# Patient Record
Sex: MALE | Race: WHITE | NOT HISPANIC OR LATINO | Employment: FULL TIME | URBAN - METROPOLITAN AREA
[De-identification: names, ages, dates, MRNs, and addresses within clinical notes are randomized per-mention and may not be internally consistent; named-entity substitution may affect disease eponyms.]

---

## 2017-08-08 ENCOUNTER — OFFICE VISIT (OUTPATIENT)
Dept: FAMILY MEDICINE CLINIC | Facility: CLINIC | Age: 52
End: 2017-08-08

## 2017-08-08 VITALS
HEIGHT: 71 IN | SYSTOLIC BLOOD PRESSURE: 140 MMHG | DIASTOLIC BLOOD PRESSURE: 80 MMHG | WEIGHT: 216.2 LBS | HEART RATE: 71 BPM | OXYGEN SATURATION: 98 % | BODY MASS INDEX: 30.27 KG/M2

## 2017-08-08 DIAGNOSIS — T63.441A BEE STING REACTION, ACCIDENTAL OR UNINTENTIONAL, INITIAL ENCOUNTER: ICD-10-CM

## 2017-08-08 DIAGNOSIS — F41.8 SITUATIONAL ANXIETY: Primary | ICD-10-CM

## 2017-08-08 PROCEDURE — 99214 OFFICE O/P EST MOD 30 MIN: CPT | Performed by: INTERNAL MEDICINE

## 2017-08-08 RX ORDER — ESCITALOPRAM OXALATE 10 MG/1
10 TABLET ORAL DAILY
Qty: 30 TABLET | Refills: 2 | Status: SHIPPED | OUTPATIENT
Start: 2017-08-08 | End: 2017-11-02 | Stop reason: SDUPTHER

## 2017-08-08 NOTE — PROGRESS NOTES
Subjective   Raheel Mosqueda is a 52 y.o. male who presents today for:    Stress (Nerves) and Insect Bite (Bee stings)    History of Present Illness     This, and admits to a long-standing history of anxiety and being short tempered, usually more so at work. However, this is starting to spill over at home or his stepdaughter and a 3-year-old grandson now live to 2 his stepdaughter is . His stepson, with whom he has been very close, and his wife have both commented on him being withdrawn, short tempered, and very irritable. He has noticed that he awakens frequently  will earlier in the morning and has difficulty falling back to sleep. He frequently has racing thoughts and worries during that time.    Despite his long history of irritability, he has never taken any medications. He believes he may need medicine at this time to help control his mood since there is no timeline for his stepdaughter's moving back out.    Bee/wasp stings this year have resulted in local reaction of erythema and swelling, but never any respiratory distress or cardiovascular symptoms.    Mr. Mosqueda  reports that he has never smoked. He has never used smokeless tobacco. He reports that he does not drink alcohol or use illicit drugs.     No Known Allergies    Current Outpatient Prescriptions:   •  amLODIPine (NORVASC) 5 MG tablet, TAKE 1 TABLET BY MOUTH DAILY, Disp: 30 tablet, Rfl: PRN  •  aspirin 81 MG EC tablet, Take 81 mg by mouth daily., Disp: , Rfl:   •  benazepril (LOTENSIN) 20 MG tablet, TAKE 1 TABLET BY MOUTH DAILY, Disp: 30 tablet, Rfl: PRN  •  L-Theanine 100 MG capsule, Take 200 mg by mouth Daily., Disp: , Rfl:   •  MULTIPLE VITAMIN PO, Take  by mouth., Disp: , Rfl:     Family history of bee sting reaction; father  after bee sting at 42.    Review of Systems   Constitutional: Negative for unexpected weight change.   Respiratory: Negative for shortness of breath and wheezing.    Hematological: Negative for adenopathy. Does  "not bruise/bleed easily.   Psychiatric/Behavioral: Positive for decreased concentration, dysphoric mood and sleep disturbance (early morning awakenings w/ racing thoughts). The patient is nervous/anxious.          Objective   Vitals:    08/08/17 0954 08/08/17 1017   BP: 136/88 140/80   BP Location: Left arm Left arm   Patient Position: Sitting    Cuff Size: Large Adult Large Adult   Pulse: 71    SpO2: 98%    Weight: 216 lb 3.2 oz (98.1 kg)    Height: 71\" (180.3 cm)      Physical Exam   Constitutional: He is oriented to person, place, and time. He appears well-developed and well-nourished. No distress.   Eyes: Conjunctivae are normal.   Neck: No thyromegaly present.   Cardiovascular: Normal rate and regular rhythm.    Lymphadenopathy:     He has no cervical adenopathy.   Neurological: He is alert and oriented to person, place, and time.   Psychiatric: He has a normal mood and affect. His behavior is normal. Judgment and thought content normal.       Assessment/Plan   Raheel was seen today for stress and insect bite.    Diagnoses and all orders for this visit:    Situational anxiety    Bee sting reaction, accidental or unintentional, initial encounter  -     Ambulatory Referral to Allergy    Other orders  -     escitalopram (LEXAPRO) 10 MG tablet; Take 1 tablet by mouth Daily.    25 minutes of the 35 minute office visit were spent counseling the patient regarding coping skills and treatment options. We discussed using Lexapro, his need to take it on a daily basis, and the potential side effects. Some of these may be transient, so he was encouraged to contact me before stopping the medication altogether. We also discussed the potential that this dose will need to be increased.  "

## 2017-09-19 ENCOUNTER — OFFICE VISIT (OUTPATIENT)
Dept: FAMILY MEDICINE CLINIC | Facility: CLINIC | Age: 52
End: 2017-09-19

## 2017-09-19 VITALS
DIASTOLIC BLOOD PRESSURE: 80 MMHG | OXYGEN SATURATION: 98 % | BODY MASS INDEX: 30.27 KG/M2 | HEIGHT: 71 IN | HEART RATE: 61 BPM | SYSTOLIC BLOOD PRESSURE: 118 MMHG | WEIGHT: 216.2 LBS

## 2017-09-19 DIAGNOSIS — F41.8 SITUATIONAL ANXIETY: Primary | ICD-10-CM

## 2017-09-19 PROCEDURE — 99213 OFFICE O/P EST LOW 20 MIN: CPT | Performed by: INTERNAL MEDICINE

## 2017-09-19 NOTE — PROGRESS NOTES
Subjective   Raheel Mosqueda is a 52 y.o. male who presents today for:    Anxiety (1 month f/u)    History of Present Illness   He admits to a long-standing history of anxiety and being short tempered, usually more so at work. However, this is starting to spill over at home or his stepdaughter and a 3-year-old grandson now live to 2 his stepdaughter is . His stepson, with whom he has been very close, and his wife have both commented on him being withdrawn, short tempered, and very irritable. He has noticed that he awakens frequently earlier in the morning and has difficulty falling back to sleep. He frequently has racing thoughts and worries during that time.     Despite his long history of irritability, he has never taken any medications. He believes he may need medicine at this time to help control his mood since there is no timeline for his stepdaughter's moving back out.    The above history obtained in early August was treated with Lexapro 10 mg.  He has noticed a change in his ability to handle stressful situations.  He still has trouble with sleep at times, and he still has some anxiety and mood lability, but all are better after a month on the Lexapro.    Mr. Mosqueda  reports that he has never smoked. He has never used smokeless tobacco. He reports that he does not drink alcohol or use illicit drugs.     No Known Allergies    Current Outpatient Prescriptions:   •  amLODIPine (NORVASC) 5 MG tablet, TAKE 1 TABLET BY MOUTH DAILY, Disp: 30 tablet, Rfl: PRN  •  aspirin 81 MG EC tablet, Take 81 mg by mouth daily., Disp: , Rfl:   •  benazepril (LOTENSIN) 20 MG tablet, TAKE 1 TABLET BY MOUTH DAILY, Disp: 30 tablet, Rfl: PRN  •  escitalopram (LEXAPRO) 10 MG tablet, Take 1 tablet by mouth Daily., Disp: 30 tablet, Rfl: 2  •  MULTIPLE VITAMIN PO, Take  by mouth., Disp: , Rfl:   •  L-Theanine 100 MG capsule, Take 200 mg by mouth Daily., Disp: , Rfl:       Review of Systems   Constitutional: Positive for appetite  "change.   Gastrointestinal: Negative for abdominal pain.   Psychiatric/Behavioral: Positive for dysphoric mood and sleep disturbance (improving). The patient is nervous/anxious.        Objective   Vitals:    09/19/17 1113   BP: 118/80   BP Location: Right arm   Patient Position: Sitting   Cuff Size: Large Adult   Pulse: 61   SpO2: 98%   Weight: 216 lb 3.2 oz (98.1 kg)   Height: 71\" (180.3 cm)     Physical Exam   Constitutional: He is oriented to person, place, and time. He appears well-developed and well-nourished. No distress.   Neurological: He is alert and oriented to person, place, and time.   Psychiatric: He has a normal mood and affect. His behavior is normal. Judgment and thought content normal.       Assessment/Plan   Raheel was seen today for anxiety.    Diagnoses and all orders for this visit:    Situational anxiety    Doing very well on the current dose of Lexapro.  He will call if he feels like the dose is not enough as we get into the winter months and the holidays.  We will increase it to 15 mg daily.  Alternatively, if it is sleep and racing thoughts at night to plague him, we may try adding a low-dose of trazodone at bedtime and cutting back on the Lexapro if able.  "

## 2017-11-02 RX ORDER — ESCITALOPRAM OXALATE 10 MG/1
15 TABLET ORAL DAILY
Qty: 45 TABLET | Refills: 2 | Status: SHIPPED | OUTPATIENT
Start: 2017-11-02 | End: 2018-01-29 | Stop reason: SDUPTHER

## 2017-11-07 RX ORDER — ESCITALOPRAM OXALATE 10 MG/1
TABLET ORAL
Qty: 30 TABLET | Refills: 0 | OUTPATIENT
Start: 2017-11-07

## 2017-11-08 ENCOUNTER — RESULTS ENCOUNTER (OUTPATIENT)
Dept: FAMILY MEDICINE CLINIC | Facility: CLINIC | Age: 52
End: 2017-11-08

## 2017-11-08 DIAGNOSIS — Z00.00 ROUTINE GENERAL MEDICAL EXAMINATION AT HEALTH CARE FACILITY: ICD-10-CM

## 2017-11-13 ENCOUNTER — RESULTS ENCOUNTER (OUTPATIENT)
Dept: FAMILY MEDICINE CLINIC | Facility: CLINIC | Age: 52
End: 2017-11-13

## 2017-11-13 DIAGNOSIS — Z00.00 ROUTINE GENERAL MEDICAL EXAMINATION AT HEALTH CARE FACILITY: ICD-10-CM

## 2017-12-28 DIAGNOSIS — I10 ESSENTIAL HYPERTENSION: ICD-10-CM

## 2017-12-29 RX ORDER — AMLODIPINE BESYLATE 5 MG/1
5 TABLET ORAL DAILY
Qty: 30 TABLET | Refills: 1 | Status: SHIPPED | OUTPATIENT
Start: 2017-12-29 | End: 2018-07-25 | Stop reason: SDUPTHER

## 2017-12-29 RX ORDER — BENAZEPRIL HYDROCHLORIDE 20 MG/1
20 TABLET ORAL DAILY
Qty: 30 TABLET | Refills: 1 | Status: SHIPPED | OUTPATIENT
Start: 2017-12-29 | End: 2018-01-29

## 2018-01-19 LAB
ALBUMIN SERPL-MCNC: 4.6 G/DL (ref 3.5–5.5)
ALBUMIN/GLOB SERPL: 1.5 {RATIO} (ref 1.2–2.2)
ALP SERPL-CCNC: 75 IU/L (ref 39–117)
ALT SERPL-CCNC: 23 IU/L (ref 0–44)
AST SERPL-CCNC: 25 IU/L (ref 0–40)
BASOPHILS # BLD AUTO: 0.1 X10E3/UL (ref 0–0.2)
BASOPHILS NFR BLD AUTO: 1 %
BILIRUB SERPL-MCNC: 0.7 MG/DL (ref 0–1.2)
BUN SERPL-MCNC: 17 MG/DL (ref 6–24)
BUN/CREAT SERPL: 17 (ref 9–20)
CALCIUM SERPL-MCNC: 9.7 MG/DL (ref 8.7–10.2)
CHLORIDE SERPL-SCNC: 100 MMOL/L (ref 96–106)
CHOLEST SERPL-MCNC: 204 MG/DL (ref 100–199)
CO2 SERPL-SCNC: 26 MMOL/L (ref 18–29)
CREAT SERPL-MCNC: 1 MG/DL (ref 0.76–1.27)
EOSINOPHIL # BLD AUTO: 0.2 X10E3/UL (ref 0–0.4)
EOSINOPHIL NFR BLD AUTO: 3 %
ERYTHROCYTE [DISTWIDTH] IN BLOOD BY AUTOMATED COUNT: 13.9 % (ref 12.3–15.4)
GLOBULIN SER CALC-MCNC: 3.1 G/DL (ref 1.5–4.5)
GLUCOSE SERPL-MCNC: 95 MG/DL (ref 65–99)
HCT VFR BLD AUTO: 43.4 % (ref 37.5–51)
HDLC SERPL-MCNC: 52 MG/DL
HGB BLD-MCNC: 14.9 G/DL (ref 13–17.7)
IMM GRANULOCYTES # BLD: 0 X10E3/UL (ref 0–0.1)
IMM GRANULOCYTES NFR BLD: 0 %
LDLC SERPL CALC-MCNC: 136 MG/DL (ref 0–99)
LYMPHOCYTES # BLD AUTO: 1.4 X10E3/UL (ref 0.7–3.1)
LYMPHOCYTES NFR BLD AUTO: 21 %
MCH RBC QN AUTO: 29.9 PG (ref 26.6–33)
MCHC RBC AUTO-ENTMCNC: 34.3 G/DL (ref 31.5–35.7)
MCV RBC AUTO: 87 FL (ref 79–97)
MONOCYTES # BLD AUTO: 0.9 X10E3/UL (ref 0.1–0.9)
MONOCYTES NFR BLD AUTO: 14 %
NEUTROPHILS # BLD AUTO: 4.2 X10E3/UL (ref 1.4–7)
NEUTROPHILS NFR BLD AUTO: 61 %
PLATELET # BLD AUTO: 208 X10E3/UL (ref 150–379)
POTASSIUM SERPL-SCNC: 4.7 MMOL/L (ref 3.5–5.2)
PROT SERPL-MCNC: 7.7 G/DL (ref 6–8.5)
RBC # BLD AUTO: 4.98 X10E6/UL (ref 4.14–5.8)
SODIUM SERPL-SCNC: 140 MMOL/L (ref 134–144)
TRIGL SERPL-MCNC: 82 MG/DL (ref 0–149)
VLDLC SERPL CALC-MCNC: 16 MG/DL (ref 5–40)
WBC # BLD AUTO: 6.8 X10E3/UL (ref 3.4–10.8)

## 2018-01-25 ENCOUNTER — OFFICE VISIT (OUTPATIENT)
Dept: FAMILY MEDICINE CLINIC | Facility: CLINIC | Age: 53
End: 2018-01-25

## 2018-01-25 VITALS
HEIGHT: 71 IN | DIASTOLIC BLOOD PRESSURE: 72 MMHG | SYSTOLIC BLOOD PRESSURE: 110 MMHG | BODY MASS INDEX: 30.52 KG/M2 | OXYGEN SATURATION: 99 % | WEIGHT: 218 LBS | HEART RATE: 74 BPM

## 2018-01-25 DIAGNOSIS — Z00.00 ROUTINE GENERAL MEDICAL EXAMINATION AT HEALTH CARE FACILITY: Primary | ICD-10-CM

## 2018-01-25 DIAGNOSIS — I10 ESSENTIAL HYPERTENSION: ICD-10-CM

## 2018-01-25 DIAGNOSIS — Z86.010 HISTORY OF COLON POLYPS: ICD-10-CM

## 2018-01-25 PROBLEM — Z86.0100 HISTORY OF COLON POLYPS: Status: ACTIVE | Noted: 2018-01-25

## 2018-01-25 PROCEDURE — 99396 PREV VISIT EST AGE 40-64: CPT | Performed by: INTERNAL MEDICINE

## 2018-01-25 NOTE — PROGRESS NOTES
"Subjective   Raheel Mosqueda is a 52 y.o. male who presents today for:    Annual Exam    History of Present Illness   Immunization History   Administered Date(s) Administered   • DTaP 12/14/2012   • Influenza, Quadrivalent 10/2017     C-scope in 2016, tubular adenoma found.    Family hx updated.    Mr. Mosqueda  reports that he has never smoked. He has never used smokeless tobacco. He reports that he does not drink alcohol or use illicit drugs.     No Known Allergies    Current Outpatient Prescriptions:   •  amLODIPine (NORVASC) 5 MG tablet, Take 1 tablet by mouth Daily., Disp: 30 tablet, Rfl: 1  •  aspirin 81 MG EC tablet, Take 81 mg by mouth daily., Disp: , Rfl:   •  benazepril (LOTENSIN) 20 MG tablet, Take 1 tablet by mouth Daily., Disp: 30 tablet, Rfl: 1  •  escitalopram (LEXAPRO) 10 MG tablet, Take 1.5 tablets by mouth Daily., Disp: 45 tablet, Rfl: 2  •  MULTIPLE VITAMIN PO, Take  by mouth., Disp: , Rfl:       Review of Systems   Constitutional: Negative for diaphoresis and unexpected weight change.   HENT: Positive for congestion.    Eyes: Negative for visual disturbance.   Respiratory: Negative for cough and chest tightness.    Cardiovascular: Negative for chest pain, palpitations and leg swelling.        Minimal varicose vein pains   Gastrointestinal: Negative for abdominal pain.   Endocrine: Negative for cold intolerance and heat intolerance.   Musculoskeletal: Negative for myalgias.   Allergic/Immunologic: Positive for environmental allergies.   Neurological: Negative for dizziness, syncope, numbness and headaches.   Hematological: Does not bruise/bleed easily.         Objective   Vitals:    01/25/18 0809   BP: 110/72   BP Location: Left arm   Patient Position: Sitting   Cuff Size: Large Adult   Pulse: 74   SpO2: 99%   Weight: 98.9 kg (218 lb)   Height: 180.3 cm (71\")     Physical Exam   Constitutional: He is oriented to person, place, and time. He appears well-developed and well-nourished.   Eyes: " Conjunctivae are normal. No scleral icterus.   Neck: Carotid bruit is not present. No thyroid mass and no thyromegaly present.   Cardiovascular: Normal rate, regular rhythm, normal heart sounds and intact distal pulses.    No pedal edema.   Pulmonary/Chest: Effort normal and breath sounds normal.   Abdominal: Soft. Bowel sounds are normal. He exhibits no abdominal bruit.   Lymphadenopathy:     He has no cervical adenopathy.        Right: No inguinal and no supraclavicular adenopathy present.        Left: No inguinal and no supraclavicular adenopathy present.   Neurological: He is alert and oriented to person, place, and time. He has normal strength.   Skin:        Moderate sun-exposure changes on the trunk and extremities, but no suspicious lesions noted.  Single, 3 mm, darkly pigmented nevus on the right mid back, unchanged.   Psychiatric: He has a normal mood and affect. His behavior is normal.         Assessment/Plan   Raheel was seen today for annual exam.    Diagnoses and all orders for this visit:    Routine general medical examination at health care facility    History of colon polyps    Essential hypertension    We discussed age-appropriate health maintenance issues.  He is up-to-date with regard to his immunizations.  Next colonoscopy will be due in 2021.  Labs done prior to today's office visit were reviewed, and almost all were perfect.  His LDL was slightly elevated at 136, so he will continue to make additional diet changes to bring that level and his weight down somewhat.  Otherwise, we will make no change in his blood pressure medication.    We also discussed prostate cancer screening.  At 50, his PSA was 0.3.  There is no family history.  He is in agreement with checking this level again around the age of 55, but not checking it on a yearly basis.  Prostate exam was deferred this year as well.    We will recheck his cholesterol level next year unless he makes significant diet changes in the interim  and would like to know how things are trending.    He will follow up with us in approximately 6 months for reassessment of his anxiety.  If his home situation changes enough that he would like to try stopping the Lexapro before his follow-up visit, he will give us a call so we can discuss the best way to walk off the Lexapro.

## 2018-01-27 DIAGNOSIS — I10 ESSENTIAL HYPERTENSION: ICD-10-CM

## 2018-01-29 DIAGNOSIS — I10 ESSENTIAL HYPERTENSION: ICD-10-CM

## 2018-01-29 RX ORDER — BENAZEPRIL HYDROCHLORIDE 20 MG/1
TABLET ORAL
Qty: 30 TABLET | Refills: 12 | Status: SHIPPED | OUTPATIENT
Start: 2018-01-29 | End: 2019-02-06 | Stop reason: SDUPTHER

## 2018-01-29 RX ORDER — BENAZEPRIL HYDROCHLORIDE 20 MG/1
20 TABLET ORAL DAILY
Qty: 30 TABLET | Refills: 11 | OUTPATIENT
Start: 2018-01-29

## 2018-01-29 RX ORDER — AMLODIPINE BESYLATE 5 MG/1
TABLET ORAL
Qty: 30 TABLET | Refills: 12 | Status: SHIPPED | OUTPATIENT
Start: 2018-01-29 | End: 2019-02-06 | Stop reason: SDUPTHER

## 2018-01-29 RX ORDER — ESCITALOPRAM OXALATE 10 MG/1
15 TABLET ORAL DAILY
Qty: 45 TABLET | Refills: 6 | Status: SHIPPED | OUTPATIENT
Start: 2018-01-29 | End: 2018-07-25 | Stop reason: SDUPTHER

## 2018-01-29 RX ORDER — AMLODIPINE BESYLATE 5 MG/1
5 TABLET ORAL DAILY
Qty: 30 TABLET | Refills: 11 | OUTPATIENT
Start: 2018-01-29

## 2018-07-25 ENCOUNTER — OFFICE VISIT (OUTPATIENT)
Dept: FAMILY MEDICINE CLINIC | Facility: CLINIC | Age: 53
End: 2018-07-25

## 2018-07-25 VITALS
DIASTOLIC BLOOD PRESSURE: 82 MMHG | OXYGEN SATURATION: 94 % | BODY MASS INDEX: 30.35 KG/M2 | WEIGHT: 216.8 LBS | SYSTOLIC BLOOD PRESSURE: 118 MMHG | HEIGHT: 71 IN | HEART RATE: 61 BPM

## 2018-07-25 DIAGNOSIS — F41.8 SITUATIONAL ANXIETY: ICD-10-CM

## 2018-07-25 DIAGNOSIS — I10 ESSENTIAL HYPERTENSION: Primary | ICD-10-CM

## 2018-07-25 PROCEDURE — 99213 OFFICE O/P EST LOW 20 MIN: CPT | Performed by: INTERNAL MEDICINE

## 2018-07-25 RX ORDER — ESCITALOPRAM OXALATE 10 MG/1
15 TABLET ORAL DAILY
Qty: 45 TABLET | Refills: 6 | Status: SHIPPED | OUTPATIENT
Start: 2018-07-25 | End: 2019-02-06 | Stop reason: SDUPTHER

## 2018-07-25 NOTE — PROGRESS NOTES
Subjective   Raheel Mosqueda is a 53 y.o. male who presents today for:    Hypertension (6 month f/u) and Anxiety    History of Present Illness   He has chronic, benign, essential hypertension that has been treated with amlodipine and benazepril.  He tolerates these without side effect.  He exercises regularly and denies any chest pain.  He denies TIA/CVA symptoms.    He also has anxiety that has begun within the past year or 2.  This stems from a stepdaughter who moved home with her small son, and is now living with him and his wife in their house.  He was having mood lability and feeling very short tempered/irritable.  Lexapro has done well to help him control his emotions better.  He feels more productive at work as well.  He still tries to exercise fairly regularly to also help with this.    Mr. Mosqueda  reports that he has never smoked. He has never used smokeless tobacco. He reports that he does not drink alcohol or use drugs.     No Known Allergies    Current Outpatient Prescriptions:   •  amLODIPine (NORVASC) 5 MG tablet, TAKE 1 TABLET BY MOUTH DAILY, Disp: 30 tablet, Rfl: 12  •  aspirin 81 MG EC tablet, Take 81 mg by mouth daily., Disp: , Rfl:   •  benazepril (LOTENSIN) 20 MG tablet, TAKE 1 TABLET BY MOUTH DAILY, Disp: 30 tablet, Rfl: 12  •  escitalopram (LEXAPRO) 10 MG tablet, Take 1.5 tablets by mouth Daily., Disp: 45 tablet, Rfl: 6  •  MULTIPLE VITAMIN PO, Take  by mouth., Disp: , Rfl:       Review of Systems   Constitutional: Negative for diaphoresis.   Eyes: Negative for visual disturbance.   Respiratory: Negative for cough and chest tightness.    Cardiovascular: Negative for chest pain, palpitations and leg swelling.   Gastrointestinal: Negative for abdominal pain.   Musculoskeletal: Negative for myalgias.   Neurological: Negative for dizziness, syncope, numbness and headaches.   Hematological: Does not bruise/bleed easily.         Objective   Vitals:    07/25/18 0816   BP: 118/82   BP Location: Right  "arm   Patient Position: Sitting   Cuff Size: Large Adult   Pulse: 61   SpO2: 94%   Weight: 98.3 kg (216 lb 12.8 oz)   Height: 180.3 cm (71\")     Physical Exam   Constitutional: He is oriented to person, place, and time. He appears well-developed and well-nourished.   Neck: Carotid bruit is not present.   Cardiovascular: Normal rate and regular rhythm.    Abdominal: He exhibits no abdominal bruit.   Neurological: He is alert and oriented to person, place, and time. Coordination normal.           Raheel was seen today for hypertension and anxiety.    Diagnoses and all orders for this visit:    Essential hypertension    Situational anxiety  -     escitalopram (LEXAPRO) 10 MG tablet; Take 1.5 tablets by mouth Daily.    The pressure is well controlled.  No change in blood pressure medications.    He reports that he is \"in a good place\" with regard to his anxiety, given the situation at home.  He will stay on the Lexapro 15 mg daily.  He will call if any new problems develop.  Follow-up in 6 months for a preventive health visit and to touch on these issues above.  "

## 2018-12-27 ENCOUNTER — EMERGENCY (EMERGENCY)
Facility: HOSPITAL | Age: 53
LOS: 1 days | Discharge: HOME | End: 2018-12-27
Attending: EMERGENCY MEDICINE
Payer: MEDICAID

## 2018-12-27 VITALS
OXYGEN SATURATION: 99 % | TEMPERATURE: 98 F | RESPIRATION RATE: 20 BRPM | SYSTOLIC BLOOD PRESSURE: 138 MMHG | DIASTOLIC BLOOD PRESSURE: 88 MMHG | WEIGHT: 214.95 LBS | HEART RATE: 99 BPM | HEIGHT: 70 IN

## 2018-12-27 VITALS
DIASTOLIC BLOOD PRESSURE: 91 MMHG | OXYGEN SATURATION: 99 % | TEMPERATURE: 98 F | RESPIRATION RATE: 18 BRPM | SYSTOLIC BLOOD PRESSURE: 146 MMHG | HEART RATE: 85 BPM

## 2018-12-27 DIAGNOSIS — Z98.2 PRESENCE OF CEREBROSPINAL FLUID DRAINAGE DEVICE: Chronic | ICD-10-CM

## 2018-12-27 PROCEDURE — 99283 EMERGENCY DEPT VISIT LOW MDM: CPT

## 2018-12-27 RX ORDER — ACETAMINOPHEN 500 MG
650 TABLET ORAL ONCE
Qty: 0 | Refills: 0 | Status: COMPLETED | OUTPATIENT
Start: 2018-12-27 | End: 2018-12-27

## 2018-12-27 RX ADMIN — Medication 650 MILLIGRAM(S): at 19:29

## 2018-12-27 NOTE — ED PROVIDER NOTE - OBJECTIVE STATEMENT
A 53 year old male pt with a hx of psoriasis presents to the ED c/o rash, headache. Pt reports exacerbation of his psoriasis since this morning with associated headache. Pt is on medication for his psoriasis. denies fever. denies neck pain. no chest pain or sob. no abd pain. no n/v/d. no urinary f/u/d. no back pain. no motor or sensory deficits. denies illicit drug use. no recent travel. no other acute issues symptoms or concerns

## 2018-12-27 NOTE — ED ADULT TRIAGE NOTE - CHIEF COMPLAINT QUOTE
head pain from psoriasis. No injury. states current medication not working head pain from psoriasis. No injury. states current medication not working. Has head shunt also.

## 2018-12-27 NOTE — ED STATDOCS - OBJECTIVE STATEMENT
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

## 2018-12-27 NOTE — ED STATDOCS - PROGRESS NOTE DETAILS
52 y/o M pt with PMHx of seizure disorder, TBI, Shunt in head x7 years, and cirrhosis presents to ED c/o severe HA.  MDM: pt will go to Main ED, CT Head

## 2018-12-27 NOTE — ED PROVIDER NOTE - NEUROLOGICAL, MLM
neuro: CN II - XII intact, EOMI, PERRL, no papilledema, 5/5 muscle strength x 4 extremities, no sensory deficits, 2+ dtr globally, negative babinski, no ataxic gait, normal LAM and FNT, normal romberg

## 2018-12-27 NOTE — ED PROVIDER NOTE - MEDICAL DECISION MAKING DETAILS
pt c/o pain at psoiraisis site and is neurologicvallly intact return to ed for intractable HA, persistent vomiting, or new onset motor/sensory deficits will rx steroids and continue topical steroid he already has

## 2019-01-24 DIAGNOSIS — Z12.5 SCREENING PSA (PROSTATE SPECIFIC ANTIGEN): ICD-10-CM

## 2019-01-24 DIAGNOSIS — Z00.00 ROUTINE GENERAL MEDICAL EXAMINATION AT HEALTH CARE FACILITY: Primary | ICD-10-CM

## 2019-01-25 LAB
ALBUMIN SERPL-MCNC: 4.6 G/DL (ref 3.5–5.5)
ALBUMIN/GLOB SERPL: 1.5 {RATIO} (ref 1.2–2.2)
ALP SERPL-CCNC: 76 IU/L (ref 39–117)
ALT SERPL-CCNC: 24 IU/L (ref 0–44)
APPEARANCE UR: CLEAR
AST SERPL-CCNC: 27 IU/L (ref 0–40)
BILIRUB SERPL-MCNC: 0.6 MG/DL (ref 0–1.2)
BILIRUB UR QL STRIP: NEGATIVE
BUN SERPL-MCNC: 17 MG/DL (ref 6–24)
BUN/CREAT SERPL: 15 (ref 9–20)
CALCIUM SERPL-MCNC: 9.8 MG/DL (ref 8.7–10.2)
CHLORIDE SERPL-SCNC: 100 MMOL/L (ref 96–106)
CHOLEST SERPL-MCNC: 191 MG/DL (ref 100–199)
CO2 SERPL-SCNC: 24 MMOL/L (ref 20–29)
COLOR UR: YELLOW
CREAT SERPL-MCNC: 1.16 MG/DL (ref 0.76–1.27)
ERYTHROCYTE [DISTWIDTH] IN BLOOD BY AUTOMATED COUNT: 13.7 % (ref 12.3–15.4)
GLOBULIN SER CALC-MCNC: 3.1 G/DL (ref 1.5–4.5)
GLUCOSE SERPL-MCNC: 91 MG/DL (ref 65–99)
GLUCOSE UR QL: NEGATIVE
HCT VFR BLD AUTO: 43.7 % (ref 37.5–51)
HDLC SERPL-MCNC: 48 MG/DL
HGB BLD-MCNC: 14.6 G/DL (ref 13–17.7)
HGB UR QL STRIP: NEGATIVE
KETONES UR QL STRIP: NEGATIVE
LDLC SERPL CALC-MCNC: 126 MG/DL (ref 0–99)
LEUKOCYTE ESTERASE UR QL STRIP: NEGATIVE
MCH RBC QN AUTO: 30.3 PG (ref 26.6–33)
MCHC RBC AUTO-ENTMCNC: 33.4 G/DL (ref 31.5–35.7)
MCV RBC AUTO: 91 FL (ref 79–97)
MICRO URNS: NORMAL
NITRITE UR QL STRIP: NEGATIVE
PH UR STRIP: 6.5 [PH] (ref 5–7.5)
PLATELET # BLD AUTO: 214 X10E3/UL (ref 150–379)
POTASSIUM SERPL-SCNC: 4.5 MMOL/L (ref 3.5–5.2)
PROT SERPL-MCNC: 7.7 G/DL (ref 6–8.5)
PROT UR QL STRIP: NEGATIVE
PSA SERPL-MCNC: 0.5 NG/ML (ref 0–4)
RBC # BLD AUTO: 4.82 X10E6/UL (ref 4.14–5.8)
SODIUM SERPL-SCNC: 137 MMOL/L (ref 134–144)
SP GR UR: 1.01 (ref 1–1.03)
TRIGL SERPL-MCNC: 85 MG/DL (ref 0–149)
UROBILINOGEN UR STRIP-MCNC: 0.2 MG/DL (ref 0.2–1)
VLDLC SERPL CALC-MCNC: 17 MG/DL (ref 5–40)
WBC # BLD AUTO: 6.9 X10E3/UL (ref 3.4–10.8)

## 2019-02-06 ENCOUNTER — OFFICE VISIT (OUTPATIENT)
Dept: FAMILY MEDICINE CLINIC | Facility: CLINIC | Age: 54
End: 2019-02-06

## 2019-02-06 VITALS
SYSTOLIC BLOOD PRESSURE: 116 MMHG | HEART RATE: 73 BPM | DIASTOLIC BLOOD PRESSURE: 86 MMHG | WEIGHT: 219.8 LBS | OXYGEN SATURATION: 96 % | HEIGHT: 71 IN | BODY MASS INDEX: 30.77 KG/M2

## 2019-02-06 DIAGNOSIS — Z86.010 HISTORY OF COLON POLYPS: ICD-10-CM

## 2019-02-06 DIAGNOSIS — I10 ESSENTIAL HYPERTENSION: ICD-10-CM

## 2019-02-06 DIAGNOSIS — Z12.5 PROSTATE CANCER SCREENING: ICD-10-CM

## 2019-02-06 DIAGNOSIS — Z00.00 ROUTINE GENERAL MEDICAL EXAMINATION AT HEALTH CARE FACILITY: Primary | ICD-10-CM

## 2019-02-06 DIAGNOSIS — F41.8 SITUATIONAL ANXIETY: ICD-10-CM

## 2019-02-06 PROCEDURE — 99396 PREV VISIT EST AGE 40-64: CPT | Performed by: INTERNAL MEDICINE

## 2019-02-06 RX ORDER — BENAZEPRIL HYDROCHLORIDE 20 MG/1
20 TABLET ORAL DAILY
Qty: 30 TABLET | Refills: 12 | Status: SHIPPED | OUTPATIENT
Start: 2019-02-06 | End: 2020-02-11 | Stop reason: SDUPTHER

## 2019-02-06 RX ORDER — ESCITALOPRAM OXALATE 10 MG/1
15 TABLET ORAL DAILY
Qty: 45 TABLET | Refills: 12 | Status: SHIPPED | OUTPATIENT
Start: 2019-02-06 | End: 2020-02-11 | Stop reason: SDUPTHER

## 2019-02-06 RX ORDER — AMLODIPINE BESYLATE 5 MG/1
5 TABLET ORAL DAILY
Qty: 30 TABLET | Refills: 12 | Status: SHIPPED | OUTPATIENT
Start: 2019-02-06 | End: 2020-02-11

## 2019-02-06 NOTE — PROGRESS NOTES
Subjective   Raheel Mosqueda is a 53 y.o. male who presents today for:    Annual Exam (PHE & review labs)    History of Present Illness     He is up-to-date with regard to his colonoscopy.  He is up-to-date with regard to his tetanus booster and receives a flu shot every year.    He has chronic hypertension treated with benazepril 20 mg daily and amlodipine 5 mg daily.  He tolerates these without side effect.    Within the past 1-2 years, he has started Lexapro to help him cope with stresses at home.  A stepdaughter has moved back home with her young child.  Unfortunately, the stent daughter battles addiction and has been going through divorce.  The divorce has been finalized, so that stress has diminished.  There are plans for the patient's wife and stepdaughter to begin counseling, but no steps have been taken to help with her substance abuse.  He remains functional at work and continues to work out regularly.  The regular exercise has been particularly beneficial with regard to his mood.  Denies any side effects from the Lexapro.    Mr. Mosqueda  reports that  has never smoked. he has never used smokeless tobacco. He reports that he does not drink alcohol or use drugs.     No Known Allergies    Current Outpatient Medications:   •  amLODIPine (NORVASC) 5 MG tablet, TAKE 1 TABLET BY MOUTH DAILY, Disp: 30 tablet, Rfl: 12  •  aspirin 81 MG EC tablet, Take 81 mg by mouth daily., Disp: , Rfl:   •  benazepril (LOTENSIN) 20 MG tablet, TAKE 1 TABLET BY MOUTH DAILY, Disp: 30 tablet, Rfl: 12  •  escitalopram (LEXAPRO) 10 MG tablet, Take 1.5 tablets by mouth Daily., Disp: 45 tablet, Rfl: 6  •  MULTIPLE VITAMIN PO, Take 1 tablet by mouth Daily., Disp: , Rfl:       Review of Systems   Constitutional: Negative for diaphoresis.   Eyes: Negative for visual disturbance.   Respiratory: Negative for cough and chest tightness.    Cardiovascular: Negative for chest pain, palpitations and leg swelling.   Gastrointestinal: Negative for  "abdominal pain.   Musculoskeletal: Negative for myalgias.   Neurological: Negative for dizziness, syncope, numbness and headaches.   Hematological: Does not bruise/bleed easily.   Psychiatric/Behavioral: The patient is nervous/anxious (due to stresses at home).          Objective   Vitals:    02/06/19 0910   BP: 116/86   BP Location: Right arm   Patient Position: Sitting   Cuff Size: Large Adult   Pulse: 73   SpO2: 96%   Weight: 99.7 kg (219 lb 12.8 oz)   Height: 180.3 cm (71\")     Physical Exam   Constitutional: He is oriented to person, place, and time. He appears well-developed and well-nourished.   Eyes: Conjunctivae are normal. No scleral icterus.   Neck: Carotid bruit is not present. No thyroid mass and no thyromegaly present.   Cardiovascular: Normal rate, regular rhythm, normal heart sounds and intact distal pulses.   No pedal edema.   Pulmonary/Chest: Effort normal and breath sounds normal.   Abdominal: Soft. Bowel sounds are normal. He exhibits no abdominal bruit.   Neurological: He is alert and oriented to person, place, and time. He has normal strength.   Skin: Skin is warm and dry.   Moderate sun-exposure changes on the trunk and extremities, but no suspicious lesions noted.   Psychiatric: He has a normal mood and affect.             Raheel was seen today for annual exam.    Diagnoses and all orders for this visit:    Routine general medical examination at health care facility    Prostate cancer screening    History of colon polyps  Comments:  Path: 4 mm tubular adenoma in 5/2016; recheck in 5 years.  We discussed age-appropriate health maintenance issues.  He is on track in most regards.  We discussed the shingles vaccine which he will need to get through his pharmacy.  He should continue to follow-up on a yearly basis.    Labs done prior to today's office visit were reviewed with him.  All appear to be in order.  His LDL could be a little bit lower; it sits at 126.  His HDL is okay.  PSA is perfect " at 0.5.      Essential hypertension  -     amLODIPine (NORVASC) 5 MG tablet; Take 1 tablet by mouth Daily.  -     benazepril (LOTENSIN) 20 MG tablet; Take 1 tablet by mouth Daily.  No change in meds.    Situational anxiety  -     escitalopram (LEXAPRO) 10 MG tablet; Take 1.5 tablets by mouth Daily.  We will provide him additional information regarding support services for family still and with addiction.  Otherwise, he will remain on the Lexapro for the foreseeable future, keeping an eye open towards per tenodesed to discontinue it when the situation at home improves.

## 2019-02-07 ENCOUNTER — TELEPHONE (OUTPATIENT)
Dept: FAMILY MEDICINE CLINIC | Facility: CLINIC | Age: 54
End: 2019-02-07

## 2019-02-07 NOTE — TELEPHONE ENCOUNTER
He called and wants to know if dr. Mckinley will take him, his mom and dad Meri and Joshua sierra see him

## 2019-02-11 ENCOUNTER — TELEPHONE (OUTPATIENT)
Dept: FAMILY MEDICINE CLINIC | Facility: CLINIC | Age: 54
End: 2019-02-11

## 2019-02-11 NOTE — TELEPHONE ENCOUNTER
He just had a physical last week with dr. Park, and I called to set him up with Dr. Mckinley but will dr. Mckinley do a physical on him when he sees him?

## 2019-02-25 ENCOUNTER — EMERGENCY (EMERGENCY)
Facility: HOSPITAL | Age: 54
LOS: 1 days | Discharge: HOME | End: 2019-02-25
Attending: STUDENT IN AN ORGANIZED HEALTH CARE EDUCATION/TRAINING PROGRAM
Payer: MEDICAID

## 2019-02-25 VITALS
HEART RATE: 88 BPM | WEIGHT: 207.01 LBS | RESPIRATION RATE: 18 BRPM | TEMPERATURE: 98 F | SYSTOLIC BLOOD PRESSURE: 123 MMHG | OXYGEN SATURATION: 97 % | DIASTOLIC BLOOD PRESSURE: 83 MMHG | HEIGHT: 74 IN

## 2019-02-25 DIAGNOSIS — Z98.2 PRESENCE OF CEREBROSPINAL FLUID DRAINAGE DEVICE: Chronic | ICD-10-CM

## 2019-02-25 PROCEDURE — 99284 EMERGENCY DEPT VISIT MOD MDM: CPT | Mod: 25

## 2019-02-25 PROCEDURE — 10060 I&D ABSCESS SIMPLE/SINGLE: CPT

## 2019-02-25 RX ORDER — METOCLOPRAMIDE HCL 10 MG
10 TABLET ORAL ONCE
Qty: 0 | Refills: 0 | Status: COMPLETED | OUTPATIENT
Start: 2019-02-25 | End: 2019-02-25

## 2019-02-25 RX ORDER — ACETAMINOPHEN 500 MG
975 TABLET ORAL ONCE
Qty: 0 | Refills: 0 | Status: COMPLETED | OUTPATIENT
Start: 2019-02-25 | End: 2019-02-25

## 2019-02-25 NOTE — ED STATDOCS - CLINICAL SUMMARY MEDICAL DECISION MAKING FREE TEXT BOX
Will obtain CT head to eval for shunt malfunction, the cheek swelling and pain is sialolithiases vs sialoadenitis vs gland abscess which will eval with CT of maillary and facial. Nail consistent with fungus (onychomycosis).

## 2019-02-25 NOTE — ED ADULT TRIAGE NOTE - CHIEF COMPLAINT QUOTE
swelling of my head started yesterday has a shunt in my head swelling of my head started yesterday has a shunt in my head headache started earlier today

## 2019-02-25 NOTE — ED STATDOCS - CARE PLAN
Principal Discharge DX:	Acute nonintractable headache, unspecified headache type  Secondary Diagnosis:	Scalp abscess  Secondary Diagnosis:	Sialolithiasis  Secondary Diagnosis:	Onychomycosis

## 2019-02-25 NOTE — ED ADULT TRIAGE NOTE - CCCP TRG CHIEF CMPLNT
hand pain/injury see chief complaint quote/swelling of my head medical evaluation/swelling of my head pain, head/swelling of my head swelling of my head/head injury

## 2019-02-25 NOTE — ED STATDOCS - OBJECTIVE STATEMENT
54 y/o M pt with PMHx of seizures and TBI (with shunt in head) presents to the ED c/o intermittent swelling of face onset yesterday. Reports head pain, psoriasis all over his body, and nail fungus. He has not taken anything for the pain. Denies hitting head, LOC, nausea, vomiting, or diarrhea. No further complaints at this time.

## 2019-02-25 NOTE — ED STATDOCS - SKIN, MLM
skin normal color for race, warm, dry and intact except for diffuse psoriatic plaques, on L scalp 1cm by 1cm area of induration and edema consistent with folliculitis vs small abscess, mild edema and tenderness across left parotid gland and buccal mucosa with a visualized sialolithiases. On R thumb thickening and discoloration of nail bed, chronic appearing

## 2019-02-26 VITALS
TEMPERATURE: 98 F | OXYGEN SATURATION: 99 % | HEART RATE: 63 BPM | SYSTOLIC BLOOD PRESSURE: 129 MMHG | RESPIRATION RATE: 18 BRPM | DIASTOLIC BLOOD PRESSURE: 84 MMHG

## 2019-02-26 LAB
ALBUMIN SERPL ELPH-MCNC: 3.9 G/DL — SIGNIFICANT CHANGE UP (ref 3.3–5.2)
ALP SERPL-CCNC: 94 U/L — SIGNIFICANT CHANGE UP (ref 40–120)
ALT FLD-CCNC: 15 U/L — SIGNIFICANT CHANGE UP
ANION GAP SERPL CALC-SCNC: 8 MMOL/L — SIGNIFICANT CHANGE UP (ref 5–17)
AST SERPL-CCNC: 12 U/L — SIGNIFICANT CHANGE UP
BASOPHILS # BLD AUTO: 0 K/UL — SIGNIFICANT CHANGE UP (ref 0–0.2)
BASOPHILS NFR BLD AUTO: 0.3 % — SIGNIFICANT CHANGE UP (ref 0–2)
BILIRUB SERPL-MCNC: <0.2 MG/DL — LOW (ref 0.4–2)
BUN SERPL-MCNC: 14 MG/DL — SIGNIFICANT CHANGE UP (ref 8–20)
CALCIUM SERPL-MCNC: 8.8 MG/DL — SIGNIFICANT CHANGE UP (ref 8.6–10.2)
CHLORIDE SERPL-SCNC: 106 MMOL/L — SIGNIFICANT CHANGE UP (ref 98–107)
CO2 SERPL-SCNC: 29 MMOL/L — SIGNIFICANT CHANGE UP (ref 22–29)
CREAT SERPL-MCNC: 0.99 MG/DL — SIGNIFICANT CHANGE UP (ref 0.5–1.3)
EOSINOPHIL # BLD AUTO: 0.3 K/UL — SIGNIFICANT CHANGE UP (ref 0–0.5)
EOSINOPHIL NFR BLD AUTO: 3.5 % — SIGNIFICANT CHANGE UP (ref 0–6)
GLUCOSE SERPL-MCNC: 96 MG/DL — SIGNIFICANT CHANGE UP (ref 70–115)
HCT VFR BLD CALC: 41.4 % — LOW (ref 42–52)
HGB BLD-MCNC: 13.6 G/DL — LOW (ref 14–18)
LYMPHOCYTES # BLD AUTO: 2.5 K/UL — SIGNIFICANT CHANGE UP (ref 1–4.8)
LYMPHOCYTES # BLD AUTO: 25.4 % — SIGNIFICANT CHANGE UP (ref 20–55)
MCHC RBC-ENTMCNC: 28.2 PG — SIGNIFICANT CHANGE UP (ref 27–31)
MCHC RBC-ENTMCNC: 32.9 G/DL — SIGNIFICANT CHANGE UP (ref 32–36)
MCV RBC AUTO: 85.9 FL — SIGNIFICANT CHANGE UP (ref 80–94)
MONOCYTES # BLD AUTO: 1.4 K/UL — HIGH (ref 0–0.8)
MONOCYTES NFR BLD AUTO: 14 % — HIGH (ref 3–10)
NEUTROPHILS # BLD AUTO: 5.6 K/UL — SIGNIFICANT CHANGE UP (ref 1.8–8)
NEUTROPHILS NFR BLD AUTO: 56.7 % — SIGNIFICANT CHANGE UP (ref 37–73)
PLATELET # BLD AUTO: 255 K/UL — SIGNIFICANT CHANGE UP (ref 150–400)
POTASSIUM SERPL-MCNC: 4.3 MMOL/L — SIGNIFICANT CHANGE UP (ref 3.5–5.3)
POTASSIUM SERPL-SCNC: 4.3 MMOL/L — SIGNIFICANT CHANGE UP (ref 3.5–5.3)
PROT SERPL-MCNC: 7.2 G/DL — SIGNIFICANT CHANGE UP (ref 6.6–8.7)
RBC # BLD: 4.82 M/UL — SIGNIFICANT CHANGE UP (ref 4.6–6.2)
RBC # FLD: 13.4 % — SIGNIFICANT CHANGE UP (ref 11–15.6)
SODIUM SERPL-SCNC: 143 MMOL/L — SIGNIFICANT CHANGE UP (ref 135–145)
WBC # BLD: 9.8 K/UL — SIGNIFICANT CHANGE UP (ref 4.8–10.8)
WBC # FLD AUTO: 9.8 K/UL — SIGNIFICANT CHANGE UP (ref 4.8–10.8)

## 2019-02-26 PROCEDURE — 74018 RADEX ABDOMEN 1 VIEW: CPT

## 2019-02-26 PROCEDURE — 85027 COMPLETE CBC AUTOMATED: CPT

## 2019-02-26 PROCEDURE — 71045 X-RAY EXAM CHEST 1 VIEW: CPT | Mod: 26

## 2019-02-26 PROCEDURE — 70488 CT MAXILLOFACIAL W/O & W/DYE: CPT

## 2019-02-26 PROCEDURE — 70488 CT MAXILLOFACIAL W/O & W/DYE: CPT | Mod: 26

## 2019-02-26 PROCEDURE — 71045 X-RAY EXAM CHEST 1 VIEW: CPT

## 2019-02-26 PROCEDURE — 80053 COMPREHEN METABOLIC PANEL: CPT

## 2019-02-26 PROCEDURE — 99284 EMERGENCY DEPT VISIT MOD MDM: CPT | Mod: 25

## 2019-02-26 PROCEDURE — 36415 COLL VENOUS BLD VENIPUNCTURE: CPT

## 2019-02-26 PROCEDURE — 70250 X-RAY EXAM OF SKULL: CPT | Mod: 26

## 2019-02-26 PROCEDURE — 70250 X-RAY EXAM OF SKULL: CPT

## 2019-02-26 PROCEDURE — 96375 TX/PRO/DX INJ NEW DRUG ADDON: CPT | Mod: XU

## 2019-02-26 PROCEDURE — 70450 CT HEAD/BRAIN W/O DYE: CPT | Mod: 26

## 2019-02-26 PROCEDURE — 74018 RADEX ABDOMEN 1 VIEW: CPT | Mod: 26

## 2019-02-26 PROCEDURE — 70450 CT HEAD/BRAIN W/O DYE: CPT

## 2019-02-26 PROCEDURE — 96365 THER/PROPH/DIAG IV INF INIT: CPT | Mod: XU

## 2019-02-26 PROCEDURE — 10060 I&D ABSCESS SIMPLE/SINGLE: CPT

## 2019-02-26 RX ORDER — IBUPROFEN 200 MG
1 TABLET ORAL
Qty: 16 | Refills: 0
Start: 2019-02-26 | End: 2019-03-01

## 2019-02-26 RX ADMIN — Medication 600 MILLIGRAM(S): at 02:44

## 2019-02-26 RX ADMIN — Medication 100 MILLIGRAM(S): at 02:12

## 2019-02-26 RX ADMIN — Medication 975 MILLIGRAM(S): at 02:19

## 2019-02-26 RX ADMIN — Medication 975 MILLIGRAM(S): at 02:05

## 2019-02-26 RX ADMIN — Medication 10 MILLIGRAM(S): at 02:06

## 2019-02-26 NOTE — ED ADULT NURSE NOTE - OBJECTIVE STATEMENT
assumed care of pt @ 0100. pt a&ox3. pt c/o swelling of L side of head that began few days ago. pt states that he has psoriasis and has a shunt in his head that was placed 7 years ago. visible swelling noted to L side face.

## 2019-02-28 NOTE — ED POST DISCHARGE NOTE - DETAILS
spoke to contact on file, aid not with patient, will try to contact daughter in law who is with patient

## 2019-03-01 ENCOUNTER — APPOINTMENT (OUTPATIENT)
Dept: NEUROSURGERY | Facility: CLINIC | Age: 54
End: 2019-03-01
Payer: MEDICAID

## 2019-03-01 VITALS
HEART RATE: 80 BPM | WEIGHT: 205 LBS | SYSTOLIC BLOOD PRESSURE: 145 MMHG | DIASTOLIC BLOOD PRESSURE: 98 MMHG | BODY MASS INDEX: 26.31 KG/M2 | TEMPERATURE: 98.2 F | HEIGHT: 74 IN

## 2019-03-01 PROCEDURE — 99203 OFFICE O/P NEW LOW 30 MIN: CPT

## 2019-03-01 NOTE — DATA REVIEWED
[de-identified] : IMPRESSION:  No acute intracranial hemorrhage or mass effect.   Left frontal temporal parietal craniotomy and left  shunt catheter  redemonstrated. No hydrocephalus. Trace fluid around the  shunt reservoir  in the left frontal scalp, nonspecific. \par IMPRESSION: The distal  shunt catheter in the abdomen is looped and \par possibly kinked.

## 2019-03-01 NOTE — REVIEW OF SYSTEMS
[As Noted in HPI] : as noted in HPI [Negative] : Heme/Lymph [Leg Weakness] : no leg weakness [Numbness] : no numbness [Tingling] : no tingling [Seizures] : no convulsions [Dizziness] : no dizziness [Difficulty Walking] : no difficulty walking
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
## 2019-03-01 NOTE — REASON FOR VISIT
[New Patient Visit] : a new patient visit [Referred By: _________] : Patient was referred by VAL [Family Member] : family member [FreeTextEntry1] :  shunt malfunction

## 2019-03-01 NOTE — PHYSICAL EXAM
[General Appearance - Alert] : alert [General Appearance - In No Acute Distress] : in no acute distress [General Appearance - Well Nourished] : well nourished [General Appearance - Well Developed] : well developed [General Appearance - Well-Appearing] : healthy appearing [] : normal voice and communication [Oriented To Time, Place, And Person] : oriented to person, place, and time [Impaired Insight] : insight and judgment were intact [Affect] : the affect was normal [Mood] : the mood was normal [Memory Recent] : recent memory was not impaired [Memory Remote] : remote memory was not impaired [Person] : oriented to person [Place] : oriented to place [Time] : oriented to time [Short Term Intact] : short term memory intact [Concentration Intact] : normal concentrating ability [Fluency] : fluency intact [Comprehension] : comprehension intact [Cranial Nerves Optic (II)] : visual acuity intact bilaterally,  pupils equal round and reactive to light [Cranial Nerves Oculomotor (III)] : extraocular motion intact [Cranial Nerves Trigeminal (V)] : facial sensation intact symmetrically [Cranial Nerves Facial (VII)] : face symmetrical [Cranial Nerves Glossopharyngeal (IX)] : tongue and palate midline [Cranial Nerves Accessory (XI - Cranial And Spinal)] : head turning and shoulder shrug symmetric [Cranial Nerves Hypoglossal (XII)] : there was no tongue deviation with protrusion [Motor Tone] : muscle tone was normal in all four extremities [Motor Strength] : muscle strength was normal in all four extremities [Sensation Tactile Decrease] : light touch was intact [Sensation Pain / Temperature Decrease] : pain and temperature was intact [Abnormal Walk] : normal gait [Balance] : balance was intact [No Visual Abnormalities] : no visible abnormailities [Normal] : normal [Over the Past 2 Weeks, Have You Felt Down, Depressed, or Hopeless?] : 1.) Over the past 2 weeks, have you felt down, depressed, or hopeless? No [Over the Past 2 Weeks, Have You Felt Little Interest or Pleasure Doing Things?] : 2.) Over the past 2 weeks, have you felt little interest or pleasure doing things? No

## 2019-03-01 NOTE — ASSESSMENT
[FreeTextEntry1] : Mr. Valentine presents with above history and imaging.  There is a small area of fluid collection around the surrounding area of  shunt, which per patient has improved with antibiotic therapy.   He and family will obtain OP report from [facility] to identify the type of shunt.  He will obtain xray shuntogram to r/o malfunction.  He should follow up after imaging.  He and his daughter in law know to call the office if there are new symptoms or present to nearest ER.

## 2019-03-15 ENCOUNTER — OFFICE VISIT (OUTPATIENT)
Dept: FAMILY MEDICINE CLINIC | Facility: CLINIC | Age: 54
End: 2019-03-15

## 2019-03-15 VITALS
WEIGHT: 220.1 LBS | OXYGEN SATURATION: 97 % | SYSTOLIC BLOOD PRESSURE: 128 MMHG | BODY MASS INDEX: 30.81 KG/M2 | HEART RATE: 60 BPM | HEIGHT: 71 IN | TEMPERATURE: 97.3 F | DIASTOLIC BLOOD PRESSURE: 81 MMHG

## 2019-03-15 DIAGNOSIS — I10 ESSENTIAL HYPERTENSION: Primary | ICD-10-CM

## 2019-03-15 DIAGNOSIS — F41.9 ANXIETY: ICD-10-CM

## 2019-03-15 DIAGNOSIS — L98.9 SKIN LESION OF BACK: ICD-10-CM

## 2019-03-15 PROCEDURE — 99214 OFFICE O/P EST MOD 30 MIN: CPT | Performed by: FAMILY MEDICINE

## 2019-03-15 NOTE — PROGRESS NOTES
"Subjective   Raheel Mosqueda is a 53 y.o. male.     Chief Complaint   Patient presents with   • Establish Care     Transfer from Dr. Park   • Hypertension        History of Present Illness    New patient.  Here to get established.    Hypertension.  Continues amlodipine and benazepril long-term.  He has no hypo-tension symptoms.  Does not check his blood pressure at home.  No cardiovascular symptoms.    Anxiety.  Mild depression.  He went through some situational stressors at work and at home about a year and half ago.  Is been taking Lexapro since without side effect.  Both he and his wife believe it helps.    Spot on his back.  His previous primary care doctor have been watching it for some time.  At least a year.  Irregularly shaped mole.      The following portions of the patient's history were reviewed and updated as appropriate: allergies, current medications, past family history, past medical history, past social history, past surgical history and problem list.          Review of Systems   Constitutional: Negative.    Respiratory: Negative.    Cardiovascular: Negative.    Musculoskeletal: Negative.        Objective   Blood pressure 128/81, pulse 60, temperature 97.3 °F (36.3 °C), temperature source Oral, height 180.3 cm (70.98\"), weight 99.8 kg (220 lb 1.6 oz), SpO2 97 %.  Physical Exam   Constitutional: He appears well-developed and well-nourished. No distress.   Neck: No thyromegaly present.   Cardiovascular: Normal rate, regular rhythm, normal heart sounds and intact distal pulses.   Pulmonary/Chest: Effort normal and breath sounds normal.   Musculoskeletal: He exhibits no edema.   Skin: Skin is warm and dry.   There is an irregularly-shaped macular pigmented lesion on the mid back.  Overall smooth contours but kidney shape, greater than 6 mm, slightly irregular coloring.   Psychiatric: He has a normal mood and affect. His behavior is normal. Judgment and thought content normal.   Nursing note and " vitals reviewed.      Assessment/Plan   Raheel was seen today for establish care and hypertension.    Diagnoses and all orders for this visit:    Essential hypertension    Anxiety    Skin lesion of back  -     Ambulatory Referral to Dermatology      Hypertension.  Overall well controlled.  Recommend amatory home blood pressure monitoring periodically.  Continue amlodipine and benazepril.  No side effects.    Anxiety.  In remission.  He continues maintenance therapy with Lexapro daily without side effect.  Continue same.    Skin lesion on back.  Atypical appearing nevus.  Recommend dermatological evaluation with possible excisional biopsy.  Patient aware.  Referral made.    Follow-up in 1 year.  Needs CBC, CMP, lipid panel, PSA prior to next visit

## 2020-01-31 DIAGNOSIS — I10 ESSENTIAL HYPERTENSION: Primary | ICD-10-CM

## 2020-01-31 DIAGNOSIS — Z00.00 HEALTH CARE MAINTENANCE: ICD-10-CM

## 2020-01-31 DIAGNOSIS — Z12.5 SCREENING FOR PROSTATE CANCER: ICD-10-CM

## 2020-02-05 LAB
ALBUMIN SERPL-MCNC: 4.4 G/DL (ref 3.5–5.2)
ALBUMIN/GLOB SERPL: 1.6 G/DL
ALP SERPL-CCNC: 71 U/L (ref 39–117)
ALT SERPL-CCNC: 19 U/L (ref 1–41)
AST SERPL-CCNC: 23 U/L (ref 1–40)
BASOPHILS # BLD AUTO: 0.08 10*3/MM3 (ref 0–0.2)
BASOPHILS NFR BLD AUTO: 1.3 % (ref 0–1.5)
BILIRUB SERPL-MCNC: 0.7 MG/DL (ref 0.2–1.2)
BUN SERPL-MCNC: 15 MG/DL (ref 6–20)
BUN/CREAT SERPL: 12.7 (ref 7–25)
CALCIUM SERPL-MCNC: 9.5 MG/DL (ref 8.6–10.5)
CHLORIDE SERPL-SCNC: 101 MMOL/L (ref 98–107)
CHOLEST SERPL-MCNC: 206 MG/DL (ref 0–200)
CO2 SERPL-SCNC: 23.4 MMOL/L (ref 22–29)
CREAT SERPL-MCNC: 1.18 MG/DL (ref 0.76–1.27)
EOSINOPHIL # BLD AUTO: 0.23 10*3/MM3 (ref 0–0.4)
EOSINOPHIL NFR BLD AUTO: 3.9 % (ref 0.3–6.2)
ERYTHROCYTE [DISTWIDTH] IN BLOOD BY AUTOMATED COUNT: 12.7 % (ref 12.3–15.4)
GLOBULIN SER CALC-MCNC: 2.7 GM/DL
GLUCOSE SERPL-MCNC: 88 MG/DL (ref 65–99)
HCT VFR BLD AUTO: 43 % (ref 37.5–51)
HDLC SERPL-MCNC: 47 MG/DL (ref 40–60)
HGB BLD-MCNC: 14.7 G/DL (ref 13–17.7)
IMM GRANULOCYTES # BLD AUTO: 0.01 10*3/MM3 (ref 0–0.05)
IMM GRANULOCYTES NFR BLD AUTO: 0.2 % (ref 0–0.5)
LDLC SERPL CALC-MCNC: 139 MG/DL (ref 0–100)
LYMPHOCYTES # BLD AUTO: 1.46 10*3/MM3 (ref 0.7–3.1)
LYMPHOCYTES NFR BLD AUTO: 24.5 % (ref 19.6–45.3)
MCH RBC QN AUTO: 31.1 PG (ref 26.6–33)
MCHC RBC AUTO-ENTMCNC: 34.2 G/DL (ref 31.5–35.7)
MCV RBC AUTO: 91.1 FL (ref 79–97)
MONOCYTES # BLD AUTO: 0.81 10*3/MM3 (ref 0.1–0.9)
MONOCYTES NFR BLD AUTO: 13.6 % (ref 5–12)
NEUTROPHILS # BLD AUTO: 3.38 10*3/MM3 (ref 1.7–7)
NEUTROPHILS NFR BLD AUTO: 56.5 % (ref 42.7–76)
NRBC BLD AUTO-RTO: 0 /100 WBC (ref 0–0.2)
PLATELET # BLD AUTO: 216 10*3/MM3 (ref 140–450)
POTASSIUM SERPL-SCNC: 4.6 MMOL/L (ref 3.5–5.2)
PROT SERPL-MCNC: 7.1 G/DL (ref 6–8.5)
PSA SERPL-MCNC: 0.32 NG/ML (ref 0–4)
RBC # BLD AUTO: 4.72 10*6/MM3 (ref 4.14–5.8)
SODIUM SERPL-SCNC: 139 MMOL/L (ref 136–145)
TRIGL SERPL-MCNC: 99 MG/DL (ref 0–150)
VLDLC SERPL CALC-MCNC: 19.8 MG/DL
WBC # BLD AUTO: 5.97 10*3/MM3 (ref 3.4–10.8)

## 2020-02-11 ENCOUNTER — OFFICE VISIT (OUTPATIENT)
Dept: FAMILY MEDICINE CLINIC | Facility: CLINIC | Age: 55
End: 2020-02-11

## 2020-02-11 VITALS
TEMPERATURE: 97.9 F | BODY MASS INDEX: 31.72 KG/M2 | SYSTOLIC BLOOD PRESSURE: 125 MMHG | OXYGEN SATURATION: 97 % | HEIGHT: 71 IN | WEIGHT: 226.6 LBS | HEART RATE: 61 BPM | DIASTOLIC BLOOD PRESSURE: 79 MMHG

## 2020-02-11 DIAGNOSIS — F41.9 ANXIETY: ICD-10-CM

## 2020-02-11 DIAGNOSIS — Z00.00 HEALTH CARE MAINTENANCE: Primary | ICD-10-CM

## 2020-02-11 DIAGNOSIS — I10 ESSENTIAL HYPERTENSION: ICD-10-CM

## 2020-02-11 PROCEDURE — 99396 PREV VISIT EST AGE 40-64: CPT | Performed by: FAMILY MEDICINE

## 2020-02-11 RX ORDER — AMLODIPINE BESYLATE 10 MG/1
10 TABLET ORAL DAILY
Qty: 90 TABLET | Refills: 1 | Status: SHIPPED | OUTPATIENT
Start: 2020-02-11 | End: 2020-08-17

## 2020-02-11 RX ORDER — BENAZEPRIL HYDROCHLORIDE 20 MG/1
20 TABLET ORAL DAILY
Qty: 90 TABLET | Refills: 1 | Status: SHIPPED | OUTPATIENT
Start: 2020-02-11 | End: 2020-08-17

## 2020-02-11 RX ORDER — ESCITALOPRAM OXALATE 10 MG/1
15 TABLET ORAL DAILY
Qty: 135 TABLET | Refills: 1 | Status: SHIPPED | OUTPATIENT
Start: 2020-02-11 | End: 2020-08-17

## 2020-02-11 NOTE — PROGRESS NOTES
Subjective   Raheel Mosqueda is a 54 y.o. male.     Annual Exam (follow up labs )    History of Present Illness     Annual wellness visit    Hypertension follow up. Doing well with current medication which he is taking as directed.  Blood pressure running elevated.  About 130s over high 80s low 90s.  Some 260/100.  No cardiovascular or neurological symptoms. Today's BP: 125/79.      Hyperlipidemia follow up.  Cholesterol up slightly.  He will exercising.  He is gained some weight.    Lab Results   Component Value Date    CHLPL 206 (H) 02/04/2020    TRIG 99 02/04/2020    HDL 47 02/04/2020     (H) 02/04/2020     The 10-year ASCVD risk score (Ema SIMON Jr., et al., 2013) is: 6.3%    Values used to calculate the score:      Age: 54 years      Sex: Male      Is Non- : No      Diabetic: No      Tobacco smoker: No      Systolic Blood Pressure: 125 mmHg      Is BP treated: Yes      HDL Cholesterol: 47 mg/dL      Total Cholesterol: 206 mg/dL  Immunization History   Administered Date(s) Administered   • Flu Mist 10/18/2016   • Flu Vaccine Quad PF >18YRS 11/06/2018, 10/21/2019   • Shingrix 10/21/2019   • Tdap 12/14/2012   • flucelvax quad pfs =>4 YRS 10/21/2019     Family History   Problem Relation Age of Onset   • Hypertension Mother    • Hypertension Sister    • Hypertension Sister    • Cancer Neg Hx      Social History     Tobacco Use   • Smoking status: Never Smoker   • Smokeless tobacco: Never Used   Substance Use Topics   • Alcohol use: No   • Drug use: No       The following portions of the patient's history were reviewed and updated as appropriate: allergies, current medications, past family history, past medical history, past social history, past surgical history and problem list.      Review of Systems   Constitutional: Negative.    HENT: Negative.    Respiratory: Negative.    Cardiovascular: Negative.    Gastrointestinal: Negative.  Negative for blood in stool.        No dysphagia.  No  "severe acid reflux symptoms.   Endocrine: Negative.    Genitourinary: Negative.  Negative for hematuria.   Musculoskeletal: Negative.    Skin: Negative.    Neurological: Negative.    Psychiatric/Behavioral: Negative.    All other systems reviewed and are negative.      Objective   Blood pressure 125/79, pulse 61, temperature 97.9 °F (36.6 °C), temperature source Oral, height 180.3 cm (70.98\"), weight 103 kg (226 lb 9.6 oz), SpO2 97 %.  Physical Exam   Constitutional: He is oriented to person, place, and time. No distress.   HENT:   Head: Normocephalic and atraumatic.   Right Ear: External ear normal.   Left Ear: External ear normal.   Mouth/Throat: Oropharynx is clear and moist.   Eyes: Pupils are equal, round, and reactive to light. EOM are normal.   Neck: Normal range of motion. Neck supple.   Cardiovascular: Normal rate and regular rhythm.   Pulmonary/Chest: Effort normal and breath sounds normal.   Abdominal: Soft. Bowel sounds are normal. He exhibits no distension and no mass. There is no tenderness. There is no guarding. No hernia.   Genitourinary: Prostate normal. Prostate is not enlarged and not tender.   Musculoskeletal: Normal range of motion. He exhibits no edema or tenderness.   Neurological: He is alert and oriented to person, place, and time. He exhibits normal muscle tone. Coordination normal.   Skin: Skin is warm and dry.   Psychiatric: He has a normal mood and affect. His behavior is normal.   Nursing note and vitals reviewed.      Assessment/Plan   Raheel was seen today for annual exam.    Diagnoses and all orders for this visit:    Health care maintenance    Essential hypertension  -     amLODIPine (NORVASC) 10 MG tablet; Take 1 tablet by mouth Daily.  -     benazepril (LOTENSIN) 20 MG tablet; Take 1 tablet by mouth Daily.    Anxiety    Other orders  -     escitalopram (LEXAPRO) 10 MG tablet; Take 1.5 tablets by mouth Daily.      Annual wellness visit.    Immunizations.  " Up-to-date.    Hypertension.  Increase amlodipine from 5 mg up to 10 mg day.  Continue benazepril.  Check blood pressure in 3 weeks and send me readings.  See me in 3 months.  To consider adding hydrochlorothiazide or chlorthalidone.    Chronic anxiety.  Doing well on maintenance of 1/2 tablets of Lexapro 10 mg a day.    Prostate cancer screening up-to-date.    Colon cancer screening up-to-date.    Preventive health practices discussed occluding diet and exercise.

## 2020-03-10 ENCOUNTER — OUTPATIENT (OUTPATIENT)
Dept: OUTPATIENT SERVICES | Facility: HOSPITAL | Age: 55
LOS: 1 days | End: 2020-03-10
Payer: MEDICAID

## 2020-03-10 DIAGNOSIS — G47.30 SLEEP APNEA, UNSPECIFIED: ICD-10-CM

## 2020-03-10 DIAGNOSIS — Z98.2 PRESENCE OF CEREBROSPINAL FLUID DRAINAGE DEVICE: Chronic | ICD-10-CM

## 2020-03-10 PROCEDURE — 95810 POLYSOM 6/> YRS 4/> PARAM: CPT

## 2020-03-10 PROCEDURE — 95810 POLYSOM 6/> YRS 4/> PARAM: CPT | Mod: 26

## 2020-08-15 DIAGNOSIS — I10 ESSENTIAL HYPERTENSION: ICD-10-CM

## 2020-08-17 RX ORDER — BENAZEPRIL HYDROCHLORIDE 20 MG/1
20 TABLET ORAL DAILY
Qty: 90 TABLET | Refills: 1 | Status: SHIPPED | OUTPATIENT
Start: 2020-08-17 | End: 2021-02-08 | Stop reason: SDUPTHER

## 2020-08-17 RX ORDER — AMLODIPINE BESYLATE 10 MG/1
10 TABLET ORAL DAILY
Qty: 90 TABLET | Refills: 1 | Status: SHIPPED | OUTPATIENT
Start: 2020-08-17 | End: 2021-02-08 | Stop reason: SDUPTHER

## 2020-08-17 RX ORDER — ESCITALOPRAM OXALATE 10 MG/1
TABLET ORAL
Qty: 135 TABLET | Refills: 1 | Status: SHIPPED | OUTPATIENT
Start: 2020-08-17 | End: 2021-02-08 | Stop reason: SDUPTHER
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
## 2021-01-19 ENCOUNTER — TELEPHONE (OUTPATIENT)
Dept: FAMILY MEDICINE CLINIC | Facility: CLINIC | Age: 56
End: 2021-01-19

## 2021-02-03 DIAGNOSIS — Z00.00 HEALTHCARE MAINTENANCE: Primary | ICD-10-CM

## 2021-02-03 DIAGNOSIS — I10 ESSENTIAL HYPERTENSION: ICD-10-CM

## 2021-02-05 LAB
ALBUMIN SERPL-MCNC: 4.5 G/DL (ref 3.5–5.2)
ALBUMIN/GLOB SERPL: 1.6 G/DL
ALP SERPL-CCNC: 79 U/L (ref 39–117)
ALT SERPL-CCNC: 23 U/L (ref 1–41)
AST SERPL-CCNC: 28 U/L (ref 1–40)
BASOPHILS # BLD AUTO: 0.07 10*3/MM3 (ref 0–0.2)
BASOPHILS NFR BLD AUTO: 1.2 % (ref 0–1.5)
BILIRUB SERPL-MCNC: 0.4 MG/DL (ref 0–1.2)
BUN SERPL-MCNC: 20 MG/DL (ref 6–20)
BUN/CREAT SERPL: 17.9 (ref 7–25)
CALCIUM SERPL-MCNC: 9.6 MG/DL (ref 8.6–10.5)
CHLORIDE SERPL-SCNC: 100 MMOL/L (ref 98–107)
CHOLEST SERPL-MCNC: 214 MG/DL (ref 0–200)
CO2 SERPL-SCNC: 27.4 MMOL/L (ref 22–29)
CREAT SERPL-MCNC: 1.12 MG/DL (ref 0.76–1.27)
EOSINOPHIL # BLD AUTO: 0.23 10*3/MM3 (ref 0–0.4)
EOSINOPHIL NFR BLD AUTO: 3.8 % (ref 0.3–6.2)
ERYTHROCYTE [DISTWIDTH] IN BLOOD BY AUTOMATED COUNT: 12.8 % (ref 12.3–15.4)
GLOBULIN SER CALC-MCNC: 2.9 GM/DL
GLUCOSE SERPL-MCNC: 92 MG/DL (ref 65–99)
HCT VFR BLD AUTO: 43.7 % (ref 37.5–51)
HCV AB S/CO SERPL IA: <0.1 S/CO RATIO (ref 0–0.9)
HDLC SERPL-MCNC: 53 MG/DL (ref 40–60)
HGB BLD-MCNC: 14.9 G/DL (ref 13–17.7)
IMM GRANULOCYTES # BLD AUTO: 0.01 10*3/MM3 (ref 0–0.05)
IMM GRANULOCYTES NFR BLD AUTO: 0.2 % (ref 0–0.5)
LDLC SERPL CALC-MCNC: 145 MG/DL (ref 0–100)
LYMPHOCYTES # BLD AUTO: 1.42 10*3/MM3 (ref 0.7–3.1)
LYMPHOCYTES NFR BLD AUTO: 23.5 % (ref 19.6–45.3)
MCH RBC QN AUTO: 30.5 PG (ref 26.6–33)
MCHC RBC AUTO-ENTMCNC: 34.1 G/DL (ref 31.5–35.7)
MCV RBC AUTO: 89.4 FL (ref 79–97)
MONOCYTES # BLD AUTO: 0.86 10*3/MM3 (ref 0.1–0.9)
MONOCYTES NFR BLD AUTO: 14.2 % (ref 5–12)
NEUTROPHILS # BLD AUTO: 3.45 10*3/MM3 (ref 1.7–7)
NEUTROPHILS NFR BLD AUTO: 57.1 % (ref 42.7–76)
NRBC BLD AUTO-RTO: 0 /100 WBC (ref 0–0.2)
PLATELET # BLD AUTO: 232 10*3/MM3 (ref 140–450)
POTASSIUM SERPL-SCNC: 4.8 MMOL/L (ref 3.5–5.2)
PROT SERPL-MCNC: 7.4 G/DL (ref 6–8.5)
RBC # BLD AUTO: 4.89 10*6/MM3 (ref 4.14–5.8)
SODIUM SERPL-SCNC: 137 MMOL/L (ref 136–145)
TRIGL SERPL-MCNC: 87 MG/DL (ref 0–150)
VLDLC SERPL CALC-MCNC: 16 MG/DL (ref 5–40)
WBC # BLD AUTO: 6.04 10*3/MM3 (ref 3.4–10.8)

## 2021-02-05 NOTE — PROGRESS NOTES
Lab work reviewed.  Overall stable except that the cholesterol is just minimally elevated compared to previously.  We will discuss this and more at upcoming visit.

## 2021-02-08 DIAGNOSIS — I10 ESSENTIAL HYPERTENSION: ICD-10-CM

## 2021-02-08 RX ORDER — BENAZEPRIL HYDROCHLORIDE 20 MG/1
20 TABLET ORAL DAILY
Qty: 90 TABLET | Refills: 1 | Status: SHIPPED | OUTPATIENT
Start: 2021-02-08 | End: 2021-02-11 | Stop reason: SDUPTHER

## 2021-02-08 RX ORDER — ESCITALOPRAM OXALATE 10 MG/1
15 TABLET ORAL DAILY
Qty: 135 TABLET | Refills: 1 | Status: SHIPPED | OUTPATIENT
Start: 2021-02-08 | End: 2021-02-11 | Stop reason: SDUPTHER

## 2021-02-08 RX ORDER — AMLODIPINE BESYLATE 10 MG/1
10 TABLET ORAL DAILY
Qty: 90 TABLET | Refills: 1 | Status: SHIPPED | OUTPATIENT
Start: 2021-02-08 | End: 2021-02-11 | Stop reason: SDUPTHER

## 2021-02-09 ENCOUNTER — TELEPHONE (OUTPATIENT)
Dept: FAMILY MEDICINE CLINIC | Facility: CLINIC | Age: 56
End: 2021-02-09

## 2021-02-11 ENCOUNTER — TELEMEDICINE (OUTPATIENT)
Dept: FAMILY MEDICINE CLINIC | Facility: CLINIC | Age: 56
End: 2021-02-11

## 2021-02-11 VITALS — DIASTOLIC BLOOD PRESSURE: 80 MMHG | SYSTOLIC BLOOD PRESSURE: 130 MMHG

## 2021-02-11 DIAGNOSIS — E78.00 PURE HYPERCHOLESTEROLEMIA: Primary | ICD-10-CM

## 2021-02-11 DIAGNOSIS — Z12.5 SCREENING PSA (PROSTATE SPECIFIC ANTIGEN): ICD-10-CM

## 2021-02-11 DIAGNOSIS — I10 ESSENTIAL HYPERTENSION: ICD-10-CM

## 2021-02-11 PROBLEM — E78.2 MIXED HYPERLIPIDEMIA: Status: ACTIVE | Noted: 2021-02-11

## 2021-02-11 PROBLEM — F41.9 ANXIETY: Chronic | Status: ACTIVE | Noted: 2019-03-15

## 2021-02-11 PROCEDURE — 99213 OFFICE O/P EST LOW 20 MIN: CPT | Performed by: FAMILY MEDICINE

## 2021-02-11 RX ORDER — ESCITALOPRAM OXALATE 10 MG/1
15 TABLET ORAL DAILY
Qty: 135 TABLET | Refills: 1 | Status: SHIPPED | OUTPATIENT
Start: 2021-02-11 | End: 2021-11-09 | Stop reason: SDUPTHER

## 2021-02-11 RX ORDER — AMLODIPINE BESYLATE 10 MG/1
10 TABLET ORAL DAILY
Qty: 90 TABLET | Refills: 1 | Status: SHIPPED | OUTPATIENT
Start: 2021-02-11 | End: 2021-11-09 | Stop reason: SDUPTHER

## 2021-02-11 RX ORDER — BENAZEPRIL HYDROCHLORIDE 20 MG/1
20 TABLET ORAL DAILY
Qty: 90 TABLET | Refills: 1 | Status: SHIPPED | OUTPATIENT
Start: 2021-02-11 | End: 2021-11-09 | Stop reason: SDUPTHER

## 2021-02-11 NOTE — PROGRESS NOTES
Chief Complaint  Hypertension    Subjective          Raheel Mosqueda presents to [facility]  History of Present Illness     You have chosen to receive care through a telehealth visit.  Do you consent to use a video/audio connection for your medical care today? Yes    Hypertension follow-up.  Patient continues amlodipine 10 mg daily and benazepril 20 mg a day without side effect.  He has been checking his blood pressure at home.  They are running on average in the high 120s low 130s.  Mostly in the 120s systolic with normal diastolic.  No cardiovascular symptoms.  He continues to exercise on a regular basis.    Hyperlipidemia.  His LDL cholesterol is up slightly.  His 10-year risk of atherosclerotic artery disease is 6.9% based on ACC pooled cohort risk calculation.      The 10-year ASCVD risk score (Ema DC Jr., et al., 2013) is: 6.9%    Values used to calculate the score:      Age: 55 years      Sex: Male      Is Non- : No      Diabetic: No      Tobacco smoker: No      Systolic Blood Pressure: 130 mmHg      Is BP treated: Yes      HDL Cholesterol: 53 mg/dL      Total Cholesterol: 214 mg/dL    Anxiety.  He continues maintenance Lexapro 10 mg daily.  This was started by his previous primary care doctor 3 years ago.  No side effects.  No GI side effects.           Objective   Vital Signs:   /80     Physical Exam  Constitutional:       General: He is not in acute distress.  HENT:      Head: Atraumatic.   Pulmonary:      Effort: Pulmonary effort is normal. No respiratory distress.   Skin:     Coloration: Skin is not pale.   Neurological:      Mental Status: He is alert and oriented to person, place, and time.      Coordination: Coordination normal.   Psychiatric:         Behavior: Behavior normal.        Result Review :   The following data was reviewed by: Carl Mckinley MD on 02/11/2021:  Common labs    Common Labsle 2/4/21 2/4/21 2/4/21    0844 0844 0844    Glucose  92    BUN  20    Creatinine  1.12    eGFR Non  Am  68    eGFR  Am  82    Sodium  137    Potassium  4.8    Chloride  100    Calcium  9.6    Total Protein  7.4    Albumin  4.50    Total Bilirubin  0.4    Alkaline Phosphatase  79    AST (SGOT)  28    ALT (SGPT)  23    WBC 6.04     Hemoglobin 14.9     Hematocrit 43.7     Platelets 232     Total Cholesterol   214 (A)   Triglycerides   87   HDL Cholesterol   53   LDL Cholesterol    145 (A)   (A) Abnormal value       Comments are available for some flowsheets but are not being displayed.           Data reviewed: GI studies Colonoscopy 5 years ago          Assessment and Plan    Diagnoses and all orders for this visit:    1. Essential hypertension  -     benazepril (LOTENSIN) 20 MG tablet; Take 1 tablet by mouth Daily.  Dispense: 90 tablet; Refill: 1  -     amLODIPine (NORVASC) 10 MG tablet; Take 1 tablet by mouth Daily.  Dispense: 90 tablet; Refill: 1    Other orders  -     escitalopram (LEXAPRO) 10 MG tablet; Take 1.5 tablets by mouth Daily.  Dispense: 135 tablet; Refill: 1      Hypertension.  Overall well controlled.  Could be in the 120s systolic more, but I do not recommend adding more medication at this time.  Continue benazepril amlodipine.  See me in 6 months for complete physical examination with lab work prior.    Hyperlipidemia.  10-year risk is 6.9%.  To consider medication with the risk of 10% or more.  We will recheck lipid panel prior to next visit.    Anxiety.  Continues maintenance escitalopram.    Duration of visit approximately 15 minutes      Follow Up   No follow-ups on file.  Patient was given instructions and counseling regarding his condition or for health maintenance advice. Please see specific information pulled into the AVS if appropriate.

## 2021-02-17 NOTE — ED PROVIDER NOTE - CPE EDP ENMT NORM
PT COVID (and/or RVP) swab(s) obtained.  Advised home quarantine until test results available.  If test positive, requires home quarantine. Anticipatory guidance provided and supportive care recommended. Advised immediate return if worsening symptoms, including and not limited to sever chest pain, worsening shortness of breath, fever not reduced with OTC antipyretic use, inability to tolerate food or liquid. normal...

## 2021-02-18 ENCOUNTER — TELEPHONE (OUTPATIENT)
Dept: FAMILY MEDICINE CLINIC | Facility: CLINIC | Age: 56
End: 2021-02-18

## 2021-02-18 NOTE — TELEPHONE ENCOUNTER
Caller: Raheel Mosqueda    Relationship: Self    Best call back number: [phone]    What orders are you requesting (i.e. lab or imaging): LABS, SPECIFICALLY PSA    In what timeframe would the patient need to come in: A WEEK BEFORE HIS 6 MONTH FOLLOW-UP IN AUGUST.    Where will you receive your lab/imaging services: IN-OFFICE    Additional notes: PATIENT SCHEDULED A FOLLOW-UP WITH HIS PROVIDER AND WOULD LIKE BLOOD WORK DONE BEFORE THIS APPOINTMENT SO THAT HE CAN DISCUSS THE RESULTS.

## 2021-03-04 ENCOUNTER — PREP FOR SURGERY (OUTPATIENT)
Dept: OTHER | Facility: HOSPITAL | Age: 56
End: 2021-03-04

## 2021-03-04 ENCOUNTER — TELEPHONE (OUTPATIENT)
Dept: GASTROENTEROLOGY | Facility: CLINIC | Age: 56
End: 2021-03-04

## 2021-03-04 DIAGNOSIS — K63.5 COLON POLYPS: Primary | ICD-10-CM

## 2021-03-04 NOTE — TELEPHONE ENCOUNTER
LAST CS 5/23/16, REPORT IS IN Epic     PERSONAL HX OF COLON POLYPS   NO FAMILY HX OF COLON POLYPS / CA    PT TAKES A SMALL DOES ASPRIN    RX:  BENAZEPRIL 20MG DAILY   ESCITALOPRAM 10MG DAILY   AMLODIPINE 10MG DAILY  ASPRIN 81 MG DAILY   MULTIVITAMIN DAILY     OA FORM HAS BEEN SCANNED INTO MEDIA

## 2021-03-05 ENCOUNTER — APPOINTMENT (OUTPATIENT)
Dept: NEUROSURGERY | Facility: CLINIC | Age: 56
End: 2021-03-05
Payer: MEDICAID

## 2021-03-05 VITALS
TEMPERATURE: 98 F | WEIGHT: 195 LBS | BODY MASS INDEX: 25.03 KG/M2 | SYSTOLIC BLOOD PRESSURE: 148 MMHG | HEART RATE: 67 BPM | DIASTOLIC BLOOD PRESSURE: 94 MMHG | HEIGHT: 74 IN | OXYGEN SATURATION: 95 %

## 2021-03-05 DIAGNOSIS — Z86.79 PERSONAL HISTORY OF OTHER DISEASES OF THE CIRCULATORY SYSTEM: ICD-10-CM

## 2021-03-05 DIAGNOSIS — Z86.73 PERSONAL HISTORY OF TRANSIENT ISCHEMIC ATTACK (TIA), AND CEREBRAL INFARCTION W/OUT RESIDUAL DEFICITS: ICD-10-CM

## 2021-03-05 PROCEDURE — 99072 ADDL SUPL MATRL&STAF TM PHE: CPT

## 2021-03-05 PROCEDURE — 99213 OFFICE O/P EST LOW 20 MIN: CPT

## 2021-03-05 RX ORDER — LEVETIRACETAM 500 MG/1
500 TABLET, FILM COATED ORAL
Refills: 0 | Status: ACTIVE | COMMUNITY

## 2021-03-05 RX ORDER — DOXYCYCLINE HYCLATE 50 MG/1
50 TABLET, FILM COATED ORAL
Refills: 0 | Status: ACTIVE | COMMUNITY
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
## 2021-03-05 NOTE — ASSESSMENT
[FreeTextEntry1] : Mr. Valentine presents with above history and imaging.\par We will obtain records from [facility] in [place] for comparison.\par Follow up in 2 weeks in our office.\par No imaging at this time.\par Follow up with neurology for seizure management.\par  He and his daughter in law Jason Sosa  [phone]  know to call the office if there are new symptoms or present to nearest ER. \par

## 2021-03-17 ENCOUNTER — APPOINTMENT (OUTPATIENT)
Dept: NEUROSURGERY | Facility: CLINIC | Age: 56
End: 2021-03-17

## 2021-03-30 ENCOUNTER — BULK ORDERING (OUTPATIENT)
Dept: CASE MANAGEMENT | Facility: OTHER | Age: 56
End: 2021-03-30

## 2021-03-30 DIAGNOSIS — Z23 IMMUNIZATION DUE: ICD-10-CM

## 2021-03-31 ENCOUNTER — APPOINTMENT (OUTPATIENT)
Dept: NEUROSURGERY | Facility: CLINIC | Age: 56
End: 2021-03-31
Payer: MEDICAID

## 2021-03-31 VITALS
OXYGEN SATURATION: 98 % | DIASTOLIC BLOOD PRESSURE: 87 MMHG | HEART RATE: 86 BPM | SYSTOLIC BLOOD PRESSURE: 122 MMHG | BODY MASS INDEX: 26.18 KG/M2 | WEIGHT: 204 LBS | TEMPERATURE: 97.3 F | HEIGHT: 74 IN

## 2021-03-31 PROCEDURE — 99072 ADDL SUPL MATRL&STAF TM PHE: CPT

## 2021-03-31 PROCEDURE — 99213 OFFICE O/P EST LOW 20 MIN: CPT

## 2021-03-31 NOTE — DATA REVIEWED
[de-identified] : No evidence of intracranial hemorrhage.\par No change in the ventricular size since 2017.
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
## 2021-03-31 NOTE — ASSESSMENT
[FreeTextEntry1] : Mr. Valentine presents with above history and imaging.\par We will obtain records from [facility] in [place] for comparison.\par Follow up in 1 month with a repeat CT head w/o contrast  in our office.\par Follow up with neurology for seizure management.\par  He and his daughter in law Jason Sosa [phone] know to call the office if there are new symptoms or present to nearest ER. \par

## 2021-04-14 PROBLEM — K63.5 COLON POLYPS: Status: ACTIVE | Noted: 2021-04-14

## 2021-05-05 ENCOUNTER — APPOINTMENT (OUTPATIENT)
Dept: NEUROSURGERY | Facility: CLINIC | Age: 56
End: 2021-05-05

## 2021-05-06 ENCOUNTER — TRANSCRIBE ORDERS (OUTPATIENT)
Dept: SLEEP MEDICINE | Facility: HOSPITAL | Age: 56
End: 2021-05-06

## 2021-05-06 DIAGNOSIS — Z01.818 OTHER SPECIFIED PRE-OPERATIVE EXAMINATION: Primary | ICD-10-CM

## 2021-05-07 ENCOUNTER — LAB (OUTPATIENT)
Dept: LAB | Facility: HOSPITAL | Age: 56
End: 2021-05-07

## 2021-05-07 DIAGNOSIS — Z01.818 OTHER SPECIFIED PRE-OPERATIVE EXAMINATION: ICD-10-CM

## 2021-05-07 PROCEDURE — U0004 COV-19 TEST NON-CDC HGH THRU: HCPCS

## 2021-05-07 PROCEDURE — C9803 HOPD COVID-19 SPEC COLLECT: HCPCS

## 2021-05-08 LAB — SARS-COV-2 ORF1AB RESP QL NAA+PROBE: NOT DETECTED

## 2021-05-10 ENCOUNTER — ANESTHESIA (OUTPATIENT)
Dept: GASTROENTEROLOGY | Facility: HOSPITAL | Age: 56
End: 2021-05-10

## 2021-05-10 ENCOUNTER — HOSPITAL ENCOUNTER (OUTPATIENT)
Facility: HOSPITAL | Age: 56
Setting detail: HOSPITAL OUTPATIENT SURGERY
Discharge: HOME | End: 2021-05-10
Attending: INTERNAL MEDICINE | Admitting: INTERNAL MEDICINE

## 2021-05-10 ENCOUNTER — ANESTHESIA EVENT (OUTPATIENT)
Dept: GASTROENTEROLOGY | Facility: HOSPITAL | Age: 56
End: 2021-05-10

## 2021-05-10 VITALS
BODY MASS INDEX: 30.1 KG/M2 | WEIGHT: 215 LBS | HEART RATE: 56 BPM | HEIGHT: 71 IN | RESPIRATION RATE: 16 BRPM | SYSTOLIC BLOOD PRESSURE: 114 MMHG | DIASTOLIC BLOOD PRESSURE: 75 MMHG | OXYGEN SATURATION: 95 %

## 2021-05-10 PROCEDURE — 25010000002 PROPOFOL 10 MG/ML EMULSION: Performed by: REGISTERED NURSE

## 2021-05-10 PROCEDURE — 45378 DIAGNOSTIC COLONOSCOPY: CPT | Performed by: INTERNAL MEDICINE

## 2021-05-10 PROCEDURE — S0260 H&P FOR SURGERY: HCPCS | Performed by: INTERNAL MEDICINE

## 2021-05-10 RX ORDER — SODIUM CHLORIDE, SODIUM LACTATE, POTASSIUM CHLORIDE, CALCIUM CHLORIDE 600; 310; 30; 20 MG/100ML; MG/100ML; MG/100ML; MG/100ML
1000 INJECTION, SOLUTION INTRAVENOUS CONTINUOUS
Status: DISCONTINUED | OUTPATIENT
Start: 2021-05-10 | End: 2021-05-10 | Stop reason: HOSPADM

## 2021-05-10 RX ORDER — PROPOFOL 10 MG/ML
VIAL (ML) INTRAVENOUS CONTINUOUS PRN
Status: DISCONTINUED | OUTPATIENT
Start: 2021-05-10 | End: 2021-05-10 | Stop reason: SURG

## 2021-05-10 RX ORDER — PROPOFOL 10 MG/ML
VIAL (ML) INTRAVENOUS AS NEEDED
Status: DISCONTINUED | OUTPATIENT
Start: 2021-05-10 | End: 2021-05-10 | Stop reason: SURG

## 2021-05-10 RX ORDER — LIDOCAINE HYDROCHLORIDE 20 MG/ML
INJECTION, SOLUTION INFILTRATION; PERINEURAL AS NEEDED
Status: DISCONTINUED | OUTPATIENT
Start: 2021-05-10 | End: 2021-05-10 | Stop reason: SURG

## 2021-05-10 RX ADMIN — PROPOFOL 100 MG: 10 INJECTION, EMULSION INTRAVENOUS at 09:01

## 2021-05-10 RX ADMIN — SODIUM CHLORIDE, POTASSIUM CHLORIDE, SODIUM LACTATE AND CALCIUM CHLORIDE 1000 ML: 600; 310; 30; 20 INJECTION, SOLUTION INTRAVENOUS at 08:04

## 2021-05-10 RX ADMIN — PROPOFOL 180 MCG/KG/MIN: 10 INJECTION, EMULSION INTRAVENOUS at 09:01

## 2021-05-10 RX ADMIN — LIDOCAINE HYDROCHLORIDE 60 MG: 20 INJECTION, SOLUTION INFILTRATION; PERINEURAL at 09:01

## 2021-05-10 NOTE — DISCHARGE INSTRUCTIONS
For the next 24 hours patient needs to be with a responsible adult.    For 24 hours DO NOT drive, operate machinery, appliances, drink alcohol, make important decisions or sign legal documents.    Start with a light or bland diet if you are feeling sick to your stomach otherwise advance to regular diet as tolerated.    Follow recommendations on procedure report if provided by your doctor.    Call Dr Walton for problems [phone]    Problems may include but not limited to: large amounts of bleeding, trouble breathing, repeated vomiting, severe unrelieved pain, fever or chills.

## 2021-05-10 NOTE — H&P
"[facility]  Pre Procedure History & Physical    Chief Complaint:   Time for my colonoscopy    Subjective     HPI:   55 y.o. male     Past Medical History:   Past Medical History:   Diagnosis Date   • History of colon polyps 01/25/2018   • Hypertension    • Varicose vein of leg     Right only       Family History:  Family History   Problem Relation Age of Onset   • Hypertension Mother    • Hypertension Sister    • Hypertension Sister    • Cancer Neg Hx    • Malig Hyperthermia Neg Hx        Social History:   reports that he has never smoked. He has never used smokeless tobacco. He reports that he does not drink alcohol and does not use drugs.    Medications:   Medications Prior to Admission   Medication Sig Dispense Refill Last Dose   • amLODIPine (NORVASC) 10 MG tablet Take 1 tablet by mouth Daily. 90 tablet 1 5/9/2021   • aspirin 81 MG EC tablet Take 81 mg by mouth daily.   5/9/2021   • benazepril (LOTENSIN) 20 MG tablet Take 1 tablet by mouth Daily. 90 tablet 1 5/9/2021   • escitalopram (LEXAPRO) 10 MG tablet Take 1.5 tablets by mouth Daily. 135 tablet 1 5/9/2021   • MULTIPLE VITAMIN PO Take 1 tablet by mouth Daily.   5/9/2021       Allergies:  Patient has no known allergies.    ROS:    Pertinent items are noted in HPI     Objective     Blood pressure 148/75, pulse 68, resp. rate 14, height 180.3 cm (71\"), weight 97.5 kg (215 lb), SpO2 97 %.    Physical Exam   Constitutional: Pt is oriented to person, place, and time and well-developed, well-nourished, and in no distress.   HENT:   Mouth/Throat: Oropharynx is clear and moist.   Neck: Normal range of motion. Neck supple.   Cardiovascular: Normal rate, regular rhythm and normal heart sounds.    Pulmonary/Chest: Effort normal and breath sounds normal. No respiratory distress. No  wheezes.   Abdominal: Soft. Bowel sounds are normal.   Skin: Skin is warm and dry.   Psychiatric: Mood, memory, affect and judgment normal.     Assessment/Plan "     Diagnosis:  Encounter for screening for colon cancer    Anticipated Surgical Procedure:  Colonoscopy    The risks, benefits, and alternatives of this procedure have been discussed with the patient or the responsible party- the patient understands and agrees to proceed.

## 2021-05-10 NOTE — ANESTHESIA PREPROCEDURE EVALUATION
Anesthesia Evaluation     Patient summary reviewed and Nursing notes reviewed   NPO Solid Status: > 8 hours  NPO Liquid Status: > 2 hours           Airway   Mallampati: I  TM distance: >3 FB  Neck ROM: full  No difficulty expected  Dental - normal exam     Pulmonary - negative pulmonary ROS and normal exam   Cardiovascular - normal exam    (+) hypertension, hyperlipidemia,       Neuro/Psych  (+) psychiatric history Anxiety,     GI/Hepatic/Renal/Endo - negative ROS     Musculoskeletal (-) negative ROS    Abdominal  - normal exam    Bowel sounds: normal.   Substance History - negative use     OB/GYN negative ob/gyn ROS         Other                      Anesthesia Plan    ASA 3     MAC       Anesthetic plan, all risks, benefits, and alternatives have been provided, discussed and informed consent has been obtained with: patient.

## 2021-05-10 NOTE — ANESTHESIA POSTPROCEDURE EVALUATION
"Patient: Raheel Mosqueda    Procedure Summary     Date: 05/10/21 Room / Location: [facility] ENDOSCOPY 8 /  [facility]    Anesthesia Start: 0856 Anesthesia Stop: 0922    Procedure: COLONOSCOPY into cecum/terminal ileum (N/A ) Diagnosis:       Colon polyps      (Colon polyps [K63.5])    Surgeons: Flavio Walton MD Provider: Lobo William MD    Anesthesia Type: MAC ASA Status: 3          Anesthesia Type: MAC    Vitals  Vitals Value Taken Time   /75 05/10/21 0936   Temp     Pulse 56 05/10/21 0936   Resp 16 05/10/21 0936   SpO2 95 % 05/10/21 0936           Post Anesthesia Care and Evaluation    Patient location during evaluation: PACU  Patient participation: complete - patient participated  Level of consciousness: awake  Pain score: 0  Pain management: adequate  Airway patency: patent  Anesthetic complications: No anesthetic complications  PONV Status: none  Cardiovascular status: acceptable  Respiratory status: acceptable  Hydration status: acceptable    Comments: /75 (BP Location: Left arm, Patient Position: Sitting)   Pulse 56   Resp 16   Ht 180.3 cm (71\")   Wt 97.5 kg (215 lb)   SpO2 95%   BMI 29.99 kg/m²       "

## 2021-05-21 ENCOUNTER — APPOINTMENT (OUTPATIENT)
Dept: NEUROSURGERY | Facility: CLINIC | Age: 56
End: 2021-05-21
Payer: MEDICAID

## 2021-05-21 DIAGNOSIS — G91.9 HYDROCEPHALUS, UNSPECIFIED: ICD-10-CM

## 2021-05-21 DIAGNOSIS — T85.618A BREAKDOWN (MECHANICAL) OF OTHER SPECIFIED INTERNAL PROSTHETIC DEVICES, IMPLANTS AND GRAFTS, INITIAL ENCOUNTER: ICD-10-CM

## 2021-05-21 PROCEDURE — 99072 ADDL SUPL MATRL&STAF TM PHE: CPT

## 2021-05-21 PROCEDURE — 99213 OFFICE O/P EST LOW 20 MIN: CPT

## 2021-05-21 NOTE — ASSESSMENT
[FreeTextEntry1] : Mr. Valentine presents with above history and imaging.\par We will obtain records from [facility] in [place] for comparison.\par Will obtain CT head results and disc from [facility]. \par Follow up with neurology for seizure management.\par  He and his daughter in law Jason Sosa [phone] know to call the office if there are new symptoms or present to nearest ER. \par  \par
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
## 2021-05-21 NOTE — REVIEW OF SYSTEMS
[As Noted in HPI] : as noted in HPI [Seizures] : convulsions [Negative] : Heme/Lymph [Tension Headache] : no tension-type headache [Difficulty Walking] : no difficulty walking

## 2021-08-11 ENCOUNTER — OFFICE VISIT (OUTPATIENT)
Dept: FAMILY MEDICINE CLINIC | Facility: CLINIC | Age: 56
End: 2021-08-11

## 2021-08-11 VITALS
TEMPERATURE: 97.5 F | HEIGHT: 71 IN | WEIGHT: 224 LBS | HEART RATE: 69 BPM | SYSTOLIC BLOOD PRESSURE: 118 MMHG | BODY MASS INDEX: 31.36 KG/M2 | OXYGEN SATURATION: 95 % | DIASTOLIC BLOOD PRESSURE: 79 MMHG

## 2021-08-11 DIAGNOSIS — E78.00 PURE HYPERCHOLESTEROLEMIA: Chronic | ICD-10-CM

## 2021-08-11 DIAGNOSIS — Z00.00 HEALTH CARE MAINTENANCE: Primary | ICD-10-CM

## 2021-08-11 DIAGNOSIS — F41.9 ANXIETY: Chronic | ICD-10-CM

## 2021-08-11 DIAGNOSIS — I10 ESSENTIAL HYPERTENSION: Chronic | ICD-10-CM

## 2021-08-11 PROCEDURE — 99396 PREV VISIT EST AGE 40-64: CPT | Performed by: FAMILY MEDICINE

## 2021-08-11 RX ORDER — ATORVASTATIN CALCIUM 10 MG/1
10 TABLET, FILM COATED ORAL DAILY
Qty: 90 TABLET | Refills: 1 | Status: SHIPPED | OUTPATIENT
Start: 2021-08-11 | End: 2022-01-18

## 2021-08-11 NOTE — PROGRESS NOTES
"Answers for HPI/ROS submitted by the patient on 8/11/2021  What is the primary reason for your visit?: High Blood Pressure    Chief Complaint  Hypertension (follow up )    Subjective          Raheel Mosqueda presents to [facility]  History of Present Illness     Here for annual healthcare maintenance visit. Non-smoker. Does not drink alcohol. He exercises daily. Including walking and lifting weights.    Hyperlipidemia. Strong family history of hyper cholesterolemia. His 10-year risk of atherosclerotic coronary artery disease is borderline elevated at 7%.    Hypertension. He continues amlodipine and benazepril. Creatinine is normal. No evidence of diabetes. He is tolerating the medication.    His review of systems overall unremarkable. No dysphagia. No blood in the stool. No stool changes. No changes in urination that bother him. No blood in the urine. There is no family history of prostate cancer.    The 10-year ASCVD risk score (Ema SIMON Jr., et al., 2013) is: 7%    Values used to calculate the score:      Age: 56 years      Sex: Male      Is Non- : No      Diabetic: No      Tobacco smoker: No      Systolic Blood Pressure: 118 mmHg      Is BP treated: Yes      HDL Cholesterol: 47 mg/dL      Total Cholesterol: 214 mg/dL      Objective   Vital Signs:   /79   Pulse 69   Temp 97.5 °F (36.4 °C) (Temporal)   Ht 180.3 cm (70.98\")   Wt 102 kg (224 lb)   SpO2 95%   BMI 31.26 kg/m²     Physical Exam  Vitals and nursing note reviewed.   Constitutional:       General: He is not in acute distress.     Appearance: He is well-developed.   HENT:      Head: Atraumatic.      Right Ear: Tympanic membrane, ear canal and external ear normal.      Left Ear: Tympanic membrane, ear canal and external ear normal.      Nose: Nose normal.      Mouth/Throat:      Pharynx: No oropharyngeal exudate.   Eyes:      General: No scleral icterus.        Right eye: No discharge.         " Left eye: No discharge.      Conjunctiva/sclera: Conjunctivae normal.   Neck:      Thyroid: No thyromegaly.   Cardiovascular:      Rate and Rhythm: Normal rate and regular rhythm.      Heart sounds: Normal heart sounds.   Pulmonary:      Effort: Pulmonary effort is normal. No respiratory distress.      Breath sounds: Normal breath sounds. No wheezing or rales.   Abdominal:      General: Bowel sounds are normal. There is no distension.      Palpations: Abdomen is soft.      Tenderness: There is no abdominal tenderness.   Musculoskeletal:      Cervical back: Normal range of motion and neck supple.   Lymphadenopathy:      Cervical: No cervical adenopathy.   Skin:     General: Skin is warm and dry.      Findings: No rash.   Neurological:      Mental Status: He is alert and oriented to person, place, and time.      Motor: No abnormal muscle tone.      Coordination: Coordination normal.   Psychiatric:         Behavior: Behavior normal.         Thought Content: Thought content normal.         Judgment: Judgment normal.        Result Review :   The following data was reviewed by: Carl Mckinley MD on 08/11/2021:  Common labs    Common Labsle 2/4/21 2/4/21 2/4/21 8/4/21 8/4/21 8/4/21    0844 0844 0844 0807 0807 0807   Glucose  92  92     BUN  20  15     Creatinine  1.12  1.02     eGFR Non  Am  68  76     eGFR African Am  82  92     Sodium  137  140     Potassium  4.8  4.6     Chloride  100  102     Calcium  9.6  9.8     Total Protein  7.4  7.3     Albumin  4.50  4.50     Total Bilirubin  0.4  0.7     Alkaline Phosphatase  79  83     AST (SGOT)  28  25     ALT (SGPT)  23  22     WBC 6.04        Hemoglobin 14.9        Hematocrit 43.7        Platelets 232        Total Cholesterol   214 (A)  214 (A)    Triglycerides   87  99    HDL Cholesterol   53  47    LDL Cholesterol    145 (A)  149 (A)    PSA      0.436   (A) Abnormal value       Comments are available for some flowsheets but are not being displayed.                      Assessment and Plan    Diagnoses and all orders for this visit:    1. Health care maintenance (Primary)    2. Essential hypertension    3. Pure hypercholesterolemia  -     Comprehensive Metabolic Panel; Future  -     Lipid Panel; Future    4. Anxiety    Other orders  -     atorvastatin (LIPITOR) 10 MG tablet; Take 1 tablet by mouth Daily.  Dispense: 90 tablet; Refill: 1      Annual healthcare maintenance visit.    Immunizations. COVID-19 and Shingrix up-to-date.    Colon cancer screening up-to-date.    Prostate cancer screening. PSA low. No  symptoms. No family history.    Hypertension. Controlled.    Hyperlipidemia. LDL continues to go up. Borderline elevated cardiovascular risk. I recommend low-dose atorvastatin 10 mg daily. Side effects including muscle aches discussed. I will see him back in 6 months with lab work prior.        Preventative health practices discussed including continuing regular exercise.      Follow Up   No follow-ups on file.  Patient was given instructions and counseling regarding his condition or for health maintenance advice. Please see specific information pulled into the AVS if appropriate.

## 2021-09-21 ENCOUNTER — EMERGENCY (EMERGENCY)
Facility: HOSPITAL | Age: 56
LOS: 1 days | Discharge: HOME | End: 2021-09-21
Attending: EMERGENCY MEDICINE
Payer: MEDICAID

## 2021-09-21 VITALS
DIASTOLIC BLOOD PRESSURE: 106 MMHG | OXYGEN SATURATION: 96 % | HEART RATE: 81 BPM | TEMPERATURE: 98 F | HEIGHT: 74 IN | RESPIRATION RATE: 18 BRPM | SYSTOLIC BLOOD PRESSURE: 161 MMHG | WEIGHT: 257.94 LBS

## 2021-09-21 VITALS — OXYGEN SATURATION: 97 % | RESPIRATION RATE: 20 BRPM

## 2021-09-21 DIAGNOSIS — Z98.2 PRESENCE OF CEREBROSPINAL FLUID DRAINAGE DEVICE: Chronic | ICD-10-CM

## 2021-09-21 LAB
ALBUMIN SERPL ELPH-MCNC: 4.7 G/DL — SIGNIFICANT CHANGE UP (ref 3.3–5.2)
ALP SERPL-CCNC: 98 U/L — SIGNIFICANT CHANGE UP (ref 40–120)
ALT FLD-CCNC: 26 U/L — SIGNIFICANT CHANGE UP
ANION GAP SERPL CALC-SCNC: 12 MMOL/L — SIGNIFICANT CHANGE UP (ref 5–17)
AST SERPL-CCNC: 34 U/L — SIGNIFICANT CHANGE UP
BASOPHILS # BLD AUTO: 0.02 K/UL — SIGNIFICANT CHANGE UP (ref 0–0.2)
BASOPHILS NFR BLD AUTO: 0.3 % — SIGNIFICANT CHANGE UP (ref 0–2)
BILIRUB SERPL-MCNC: 0.4 MG/DL — SIGNIFICANT CHANGE UP (ref 0.4–2)
BUN SERPL-MCNC: 11.4 MG/DL — SIGNIFICANT CHANGE UP (ref 8–20)
CALCIUM SERPL-MCNC: 9.4 MG/DL — SIGNIFICANT CHANGE UP (ref 8.6–10.2)
CHLORIDE SERPL-SCNC: 101 MMOL/L — SIGNIFICANT CHANGE UP (ref 98–107)
CO2 SERPL-SCNC: 25 MMOL/L — SIGNIFICANT CHANGE UP (ref 22–29)
CREAT SERPL-MCNC: 1.11 MG/DL — SIGNIFICANT CHANGE UP (ref 0.5–1.3)
D DIMER BLD IA.RAPID-MCNC: <150 NG/ML DDU — SIGNIFICANT CHANGE UP
EOSINOPHIL # BLD AUTO: 0.48 K/UL — SIGNIFICANT CHANGE UP (ref 0–0.5)
EOSINOPHIL NFR BLD AUTO: 7.6 % — HIGH (ref 0–6)
GLUCOSE SERPL-MCNC: 110 MG/DL — HIGH (ref 70–99)
HCT VFR BLD CALC: 48 % — SIGNIFICANT CHANGE UP (ref 39–50)
HGB BLD-MCNC: 15.8 G/DL — SIGNIFICANT CHANGE UP (ref 13–17)
IMM GRANULOCYTES NFR BLD AUTO: 0.2 % — SIGNIFICANT CHANGE UP (ref 0–1.5)
LYMPHOCYTES # BLD AUTO: 2.28 K/UL — SIGNIFICANT CHANGE UP (ref 1–3.3)
LYMPHOCYTES # BLD AUTO: 36 % — SIGNIFICANT CHANGE UP (ref 13–44)
MCHC RBC-ENTMCNC: 28.6 PG — SIGNIFICANT CHANGE UP (ref 27–34)
MCHC RBC-ENTMCNC: 32.9 GM/DL — SIGNIFICANT CHANGE UP (ref 32–36)
MCV RBC AUTO: 86.8 FL — SIGNIFICANT CHANGE UP (ref 80–100)
MONOCYTES # BLD AUTO: 0.96 K/UL — HIGH (ref 0–0.9)
MONOCYTES NFR BLD AUTO: 15.1 % — HIGH (ref 2–14)
NEUTROPHILS # BLD AUTO: 2.59 K/UL — SIGNIFICANT CHANGE UP (ref 1.8–7.4)
NEUTROPHILS NFR BLD AUTO: 40.8 % — LOW (ref 43–77)
NT-PROBNP SERPL-SCNC: 23 PG/ML — SIGNIFICANT CHANGE UP (ref 0–300)
PLATELET # BLD AUTO: 252 K/UL — SIGNIFICANT CHANGE UP (ref 150–400)
POTASSIUM SERPL-MCNC: 4.9 MMOL/L — SIGNIFICANT CHANGE UP (ref 3.5–5.3)
POTASSIUM SERPL-SCNC: 4.9 MMOL/L — SIGNIFICANT CHANGE UP (ref 3.5–5.3)
PROT SERPL-MCNC: 8.1 G/DL — SIGNIFICANT CHANGE UP (ref 6.6–8.7)
RBC # BLD: 5.53 M/UL — SIGNIFICANT CHANGE UP (ref 4.2–5.8)
RBC # FLD: 12.7 % — SIGNIFICANT CHANGE UP (ref 10.3–14.5)
SARS-COV-2 RNA SPEC QL NAA+PROBE: SIGNIFICANT CHANGE UP
SODIUM SERPL-SCNC: 138 MMOL/L — SIGNIFICANT CHANGE UP (ref 135–145)
TROPONIN T SERPL-MCNC: <0.01 NG/ML — SIGNIFICANT CHANGE UP (ref 0–0.06)
WBC # BLD: 6.34 K/UL — SIGNIFICANT CHANGE UP (ref 3.8–10.5)
WBC # FLD AUTO: 6.34 K/UL — SIGNIFICANT CHANGE UP (ref 3.8–10.5)

## 2021-09-21 PROCEDURE — 99284 EMERGENCY DEPT VISIT MOD MDM: CPT | Mod: 25

## 2021-09-21 PROCEDURE — 71045 X-RAY EXAM CHEST 1 VIEW: CPT | Mod: 26

## 2021-09-21 PROCEDURE — 83880 ASSAY OF NATRIURETIC PEPTIDE: CPT

## 2021-09-21 PROCEDURE — U0005: CPT

## 2021-09-21 PROCEDURE — 99284 EMERGENCY DEPT VISIT MOD MDM: CPT

## 2021-09-21 PROCEDURE — 84484 ASSAY OF TROPONIN QUANT: CPT

## 2021-09-21 PROCEDURE — 93005 ELECTROCARDIOGRAM TRACING: CPT

## 2021-09-21 PROCEDURE — U0003: CPT

## 2021-09-21 PROCEDURE — 36415 COLL VENOUS BLD VENIPUNCTURE: CPT

## 2021-09-21 PROCEDURE — 71045 X-RAY EXAM CHEST 1 VIEW: CPT

## 2021-09-21 PROCEDURE — 85379 FIBRIN DEGRADATION QUANT: CPT

## 2021-09-21 PROCEDURE — 85025 COMPLETE CBC W/AUTO DIFF WBC: CPT

## 2021-09-21 PROCEDURE — 94640 AIRWAY INHALATION TREATMENT: CPT

## 2021-09-21 PROCEDURE — 93010 ELECTROCARDIOGRAM REPORT: CPT

## 2021-09-21 PROCEDURE — 80053 COMPREHEN METABOLIC PANEL: CPT

## 2021-09-21 PROCEDURE — 96374 THER/PROPH/DIAG INJ IV PUSH: CPT

## 2021-09-21 RX ORDER — ALBUTEROL 90 UG/1
2 AEROSOL, METERED ORAL
Qty: 1 | Refills: 0
Start: 2021-09-21 | End: 2021-09-27

## 2021-09-21 RX ORDER — IPRATROPIUM/ALBUTEROL SULFATE 18-103MCG
3 AEROSOL WITH ADAPTER (GRAM) INHALATION ONCE
Refills: 0 | Status: COMPLETED | OUTPATIENT
Start: 2021-09-21 | End: 2021-09-21

## 2021-09-21 RX ORDER — DEXAMETHASONE 0.5 MG/5ML
6 ELIXIR ORAL ONCE
Refills: 0 | Status: COMPLETED | OUTPATIENT
Start: 2021-09-21 | End: 2021-09-21

## 2021-09-21 RX ORDER — LEVETIRACETAM 250 MG/1
1.5 TABLET, FILM COATED ORAL
Qty: 0 | Refills: 0 | DISCHARGE

## 2021-09-21 RX ORDER — ALBUTEROL 90 UG/1
2.5 AEROSOL, METERED ORAL ONCE
Refills: 0 | Status: COMPLETED | OUTPATIENT
Start: 2021-09-21 | End: 2021-09-21

## 2021-09-21 RX ADMIN — Medication 6 MILLIGRAM(S): at 12:41

## 2021-09-21 RX ADMIN — Medication 3 MILLILITER(S): at 12:42

## 2021-09-21 RX ADMIN — ALBUTEROL 2.5 MILLIGRAM(S): 90 AEROSOL, METERED ORAL at 12:41

## 2021-09-21 NOTE — ED STATDOCS - NS_ ATTENDINGSCRIBEDETAILS _ED_A_ED_FT
I, Luis Felipe Darling, performed the initial face to face bedside interview with this patient regarding history of present illness, review of symptoms and relevant past medical, social and family history.  I completed an independent physical examination.  I was the initial provider who evaluated this patient. I have signed out the follow up of any pending tests (i.e. labs, radiological studies) to the ACP.  I have communicated the patient’s plan of care and disposition with the ACP.  The history, relevant review of systems, past medical and surgical history, medical decision making, and physical examination was documented by the scribe in my presence and I attest to the accuracy of the documentation.

## 2021-09-21 NOTE — ED STATDOCS - NSFOLLOWUPINSTRUCTIONS_ED_ALL_ED_FT
Follow up with PMD.  Follow up with pulmonology.  Take medication as prescribed.  Come back with new or worsening symptoms.

## 2021-09-21 NOTE — ED ADULT NURSE NOTE - NSICDXPASTMEDICALHX_GEN_ALL_CORE_FT
PAST MEDICAL HISTORY:  Reflux     Seizure     TBI (traumatic brain injury) short term memory lose   also shunt

## 2021-09-21 NOTE — ED STATDOCS - PATIENT PORTAL LINK FT
You can access the FollowMyHealth Patient Portal offered by [facility] by registering at the following website: [email or web address]. By joining [facility]’s FollowMyHealth portal, you will also be able to view your health information using other applications (apps) compatible with our system.

## 2021-09-21 NOTE — ED STATDOCS - PROGRESS NOTE DETAILS
PT evaluated by intake physician. HPI/PE/ROS as noted above. Will follow up plan per intake physician. pt feeling much better. Will dc with f/u and return precautions. Will send inhaler and steroids to pharmacy,

## 2021-09-21 NOTE — ED STATDOCS - OBJECTIVE STATEMENT
57 y/o male with no prior PMHx , c/o congestion. Patient reports chest congestion and difficulty breathing x 2 days. Patient is not covid vaccinated.   Smoker. Denies fever, chill, nausea, vomiting. Allergic to Keflex.

## 2021-09-21 NOTE — ED ADULT NURSE NOTE - OBJECTIVE STATEMENT
pt with chest congestion and wheezing for 4-5 days at home  'I just couldn't stay home like that anymore'   +cough   was not vaccinated, 'I never leave my house'

## 2021-09-21 NOTE — ED STATDOCS - CARE PROVIDER_API CALL
Kwaku Arango (DO)  Critical Care Medicine; Internal Medicine; Pulmonary Disease  [address] [place] [address]  Phone: [phone]  Fax: [phone]  Follow Up Time:

## 2021-09-24 ENCOUNTER — APPOINTMENT (OUTPATIENT)
Dept: NEUROSURGERY | Facility: CLINIC | Age: 56
End: 2021-09-24

## 2021-11-09 DIAGNOSIS — I10 ESSENTIAL HYPERTENSION: ICD-10-CM

## 2021-11-09 RX ORDER — BENAZEPRIL HYDROCHLORIDE 20 MG/1
20 TABLET ORAL DAILY
Qty: 90 TABLET | Refills: 1 | Status: SHIPPED | OUTPATIENT
Start: 2021-11-09 | End: 2022-05-03

## 2021-11-09 RX ORDER — AMLODIPINE BESYLATE 10 MG/1
10 TABLET ORAL DAILY
Qty: 90 TABLET | Refills: 1 | Status: SHIPPED | OUTPATIENT
Start: 2021-11-09 | End: 2022-05-03

## 2021-11-09 RX ORDER — ESCITALOPRAM OXALATE 10 MG/1
15 TABLET ORAL DAILY
Qty: 135 TABLET | Refills: 1 | Status: SHIPPED | OUTPATIENT
Start: 2021-11-09 | End: 2022-02-03

## 2021-11-09 NOTE — TELEPHONE ENCOUNTER
Rx Refill Note  Requested Prescriptions     Pending Prescriptions Disp Refills   • benazepril (LOTENSIN) 20 MG tablet 90 tablet 1     Sig: Take 1 tablet by mouth Daily.   • escitalopram (LEXAPRO) 10 MG tablet 135 tablet 1     Sig: Take 1.5 tablets by mouth Daily.   • amLODIPine (NORVASC) 10 MG tablet 90 tablet 1     Sig: Take 1 tablet by mouth Daily.      Last office visit with prescribing clinician: 8/11/2021      Next office visit with prescribing clinician: 2/11/2022            Kaitlyn Lackey MA  11/09/21, 15:38 EST

## 2021-11-30 ENCOUNTER — OFFICE VISIT (OUTPATIENT)
Dept: FAMILY MEDICINE CLINIC | Facility: CLINIC | Age: 56
End: 2021-11-30

## 2021-11-30 VITALS
WEIGHT: 229 LBS | HEIGHT: 71 IN | TEMPERATURE: 97.7 F | BODY MASS INDEX: 32.06 KG/M2 | DIASTOLIC BLOOD PRESSURE: 83 MMHG | OXYGEN SATURATION: 97 % | HEART RATE: 64 BPM | SYSTOLIC BLOOD PRESSURE: 130 MMHG

## 2021-11-30 DIAGNOSIS — H61.22 IMPACTED CERUMEN OF LEFT EAR: Primary | ICD-10-CM

## 2021-11-30 DIAGNOSIS — H60.543 ECZEMATOID OTITIS EXTERNA OF BOTH EARS, UNSPECIFIED CHRONICITY: ICD-10-CM

## 2021-11-30 PROCEDURE — 99213 OFFICE O/P EST LOW 20 MIN: CPT | Performed by: FAMILY MEDICINE

## 2021-11-30 PROCEDURE — 69209 REMOVE IMPACTED EAR WAX UNI: CPT | Performed by: FAMILY MEDICINE

## 2021-11-30 NOTE — PROGRESS NOTES
"Chief Complaint  drainage from ears    Subjective          Raheel Mosqueda presents to [facility]  History of Present Illness     Ear canals have been slightly scaly and bothersome for a number of weeks.  He started using some earwax softening drops and now he cannot hear out of his left ear.  There is been maybe a little bit of drainage.  Will be the pain and pressure in the left ear now but not previously.  Otherwise this does not bother him.    Objective   Vital Signs:   /83   Pulse 64   Temp 97.7 °F (36.5 °C) (Temporal)   Ht 180.3 cm (70.98\")   Wt 104 kg (229 lb)   SpO2 97%   BMI 31.95 kg/m²     Physical Exam  Constitutional:       General: He is not in acute distress.     Appearance: Normal appearance.   HENT:      Ears:      Comments: Left canal blocked with cerumen.  Bilateral seborrheic dermatitis  Musculoskeletal:      Cervical back: Normal range of motion. No tenderness.   Lymphadenopathy:      Cervical: No cervical adenopathy.   Neurological:      Mental Status: He is alert.        Left-sided ear wax was removed with a combination of flushing and also curette which was performed by MD.  The cerumen impaction is resolved.  The tympanic membrane unremarkable.  There are some mild otitis externa.  No foreign body.    Result Review :                 Assessment and Plan    Diagnoses and all orders for this visit:    1. Impacted cerumen of left ear (Primary)    2. Eczematoid otitis externa of both ears, unspecified chronicity      Cerumen impaction left ear.  Resolved with flushing and curette.  He has some mild residual otitis externa bilaterally suggestive of seborrheic dermatitis or some type of eczema.  It does not bother him.  Does not itch.  Just flaky.  At this time there is nothing to do.  If it becomes itchy or otherwise bothersome use over-the-counter hydrocortisone cream.  If not improving he is going to let me know otherwise I will see him back as scheduled " for routine checkup      Follow Up   No follow-ups on file.  Patient was given instructions and counseling regarding his condition or for health maintenance advice. Please see specific information pulled into the AVS if appropriate.

## 2022-01-18 RX ORDER — ATORVASTATIN CALCIUM 10 MG/1
10 TABLET, FILM COATED ORAL DAILY
Qty: 90 TABLET | Refills: 1 | Status: SHIPPED | OUTPATIENT
Start: 2022-01-18 | End: 2022-07-12

## 2022-01-18 NOTE — TELEPHONE ENCOUNTER
Rx Refill Note  Requested Prescriptions     Pending Prescriptions Disp Refills   • atorvastatin (LIPITOR) 10 MG tablet [Pharmacy Med Name: ATORVASTATIN 10MG TABLETS] 90 tablet 1     Sig: TAKE 1 TABLET BY MOUTH DAILY      Last office visit with prescribing clinician: 11/30/2021      Next office visit with prescribing clinician: 2/11/2022            Kaitlyn Lackey MA  01/18/22, 08:28 EST

## 2022-02-03 RX ORDER — ESCITALOPRAM OXALATE 20 MG/1
20 TABLET ORAL DAILY
Qty: 90 TABLET | Refills: 1 | Status: SHIPPED | OUTPATIENT
Start: 2022-02-03 | End: 2022-10-17 | Stop reason: SDUPTHER

## 2022-02-11 ENCOUNTER — OFFICE VISIT (OUTPATIENT)
Dept: FAMILY MEDICINE CLINIC | Facility: CLINIC | Age: 57
End: 2022-02-11

## 2022-02-11 VITALS
HEART RATE: 66 BPM | HEIGHT: 71 IN | BODY MASS INDEX: 32.59 KG/M2 | DIASTOLIC BLOOD PRESSURE: 78 MMHG | OXYGEN SATURATION: 96 % | SYSTOLIC BLOOD PRESSURE: 129 MMHG | WEIGHT: 232.8 LBS | TEMPERATURE: 96.9 F

## 2022-02-11 DIAGNOSIS — E78.00 PURE HYPERCHOLESTEROLEMIA: Primary | ICD-10-CM

## 2022-02-11 DIAGNOSIS — I10 ESSENTIAL HYPERTENSION: ICD-10-CM

## 2022-02-11 DIAGNOSIS — Z12.5 SCREENING PSA (PROSTATE SPECIFIC ANTIGEN): ICD-10-CM

## 2022-02-11 PROCEDURE — 99213 OFFICE O/P EST LOW 20 MIN: CPT | Performed by: FAMILY MEDICINE

## 2022-02-11 NOTE — PROGRESS NOTES
"Answers for HPI/ROS submitted by the patient on 2/5/2022  What is the primary reason for your visit?: Other  Please describe your symptoms.: Follow up on new medicine  Have you had these symptoms before?: No  How long have you been having these symptoms?: Greater than 2 weeks    Chief Complaint  Hyperlipidemia (f/u )    Subjective          Raheel Mosqueda presents to [facility]  History of Present Illness     Follow-up hyperlipidemia.  He had a intermediate cardiovascular risk of 7%.  We added very low-dose atorvastatin and his risk is now down to 5%.  He has no myopathy symptoms.  His LFTs are normal.  His LDL cholesterol dropped around 30%.  Now less than 100.  He continues his blood pressure medication and his anxiety and depression medicine.  Recently increased anxiety medication from 15 to 20 mg    The 10-year ASCVD risk score (Ema SIMON Jr., et al., 2013) is: 5.6%    Values used to calculate the score:      Age: 56 years      Sex: Male      Is Non- : No      Diabetic: No      Tobacco smoker: No      Systolic Blood Pressure: 129 mmHg      Is BP treated: Yes      HDL Cholesterol: 49 mg/dL      Total Cholesterol: 155 mg/dL      Objective   Vital Signs:   /78   Pulse 66   Temp 96.9 °F (36.1 °C) (Temporal)   Ht 180.3 cm (70.98\")   Wt 106 kg (232 lb 12.8 oz)   SpO2 96%   BMI 32.48 kg/m²     Physical Exam  Vitals and nursing note reviewed.   Constitutional:       General: He is not in acute distress.     Appearance: He is well-developed.   Cardiovascular:      Rate and Rhythm: Normal rate and regular rhythm.      Heart sounds: Normal heart sounds.   Pulmonary:      Effort: Pulmonary effort is normal.      Breath sounds: Normal breath sounds.   Musculoskeletal:      Cervical back: Normal range of motion.   Skin:     General: Skin is warm and dry.   Psychiatric:         Mood and Affect: Mood normal.        Result Review :                 Assessment and " Plan    Diagnoses and all orders for this visit:    1. Pure hypercholesterolemia (Primary)  -     Comprehensive Metabolic Panel; Future  -     CBC & Differential; Future  -     Lipid Panel; Future    2. Essential hypertension  -     Comprehensive Metabolic Panel; Future  -     CBC & Differential; Future  -     Lipid Panel; Future    3. Screening PSA (prostate specific antigen)  -     PSA Screen; Future        Follow Up   No follow-ups on file.  Patient was given instructions and counseling regarding his condition or for health maintenance advice. Please see specific information pulled into the AVS if appropriate.     Hyperlipidemia.  Improved cholesterol.  No side effects.  Continue low-dose atorvastatin.  I will see him back in 6 months for complete physical examination with lab work prior.

## 2022-05-03 DIAGNOSIS — I10 ESSENTIAL HYPERTENSION: ICD-10-CM

## 2022-05-03 RX ORDER — BENAZEPRIL HYDROCHLORIDE 20 MG/1
20 TABLET ORAL DAILY
Qty: 90 TABLET | Refills: 1 | Status: SHIPPED | OUTPATIENT
Start: 2022-05-03 | End: 2022-10-27

## 2022-05-03 RX ORDER — AMLODIPINE BESYLATE 10 MG/1
10 TABLET ORAL DAILY
Qty: 90 TABLET | Refills: 1 | Status: SHIPPED | OUTPATIENT
Start: 2022-05-03 | End: 2022-10-27

## 2022-05-03 NOTE — TELEPHONE ENCOUNTER
Rx Refill Note  Requested Prescriptions     Pending Prescriptions Disp Refills   • amLODIPine (NORVASC) 10 MG tablet [Pharmacy Med Name: AMLODIPINE BESYLATE 10MG TABLETS] 90 tablet 1     Sig: TAKE 1 TABLET BY MOUTH DAILY   • benazepril (LOTENSIN) 20 MG tablet [Pharmacy Med Name: BENAZEPRIL 20MG TABLETS] 90 tablet 1     Sig: TAKE 1 TABLET BY MOUTH DAILY      Last office visit with prescribing clinician: 2/11/2022      Next office visit with prescribing clinician: 8/18/2022            Mireille Stephens  05/03/22, 10:14 EDT

## 2022-06-22 ENCOUNTER — OFFICE VISIT (OUTPATIENT)
Dept: FAMILY MEDICINE CLINIC | Facility: CLINIC | Age: 57
End: 2022-06-22

## 2022-06-22 VITALS
SYSTOLIC BLOOD PRESSURE: 122 MMHG | WEIGHT: 233.6 LBS | HEART RATE: 59 BPM | DIASTOLIC BLOOD PRESSURE: 83 MMHG | OXYGEN SATURATION: 96 % | BODY MASS INDEX: 32.7 KG/M2 | HEIGHT: 71 IN | TEMPERATURE: 97.5 F

## 2022-06-22 DIAGNOSIS — H81.12 BENIGN PAROXYSMAL POSITIONAL VERTIGO OF LEFT EAR: Primary | ICD-10-CM

## 2022-06-22 DIAGNOSIS — L21.9 SEBORRHEIC DERMATITIS: ICD-10-CM

## 2022-06-22 PROCEDURE — 99214 OFFICE O/P EST MOD 30 MIN: CPT | Performed by: FAMILY MEDICINE

## 2022-06-22 RX ORDER — CLOTRIMAZOLE 1 %
1 CREAM (GRAM) TOPICAL 2 TIMES DAILY
Qty: 30 G | Refills: 1 | Status: SHIPPED | OUTPATIENT
Start: 2022-06-22

## 2022-06-22 RX ORDER — DIAPER,BRIEF,INFANT-TODD,DISP
1 EACH MISCELLANEOUS 2 TIMES DAILY
Qty: 30 G | Refills: 1 | Status: SHIPPED | OUTPATIENT
Start: 2022-06-22

## 2022-06-22 NOTE — PATIENT INSTRUCTIONS
"It is very likely these brief spinning episodes are caused by benign paroxysmal positional vertigo of the left inner ear.  Typically caused by small \"calcium deposits\" floating around in the inner ear.  You are not currently having any symptoms on exam today.  At this time there is no definite need to refer you to vestibular physical therapy.  I would recommend you look up the Epley maneuver on YouTube or the Internet.  Start with head to the the left side for 30 seconds, then the right side for 30 seconds, then on right side of your body for 30 seconds and then sit up.  If your symptoms are persistent please let me know and I can refer you to a physical therapist.  If they are very severe seek medical attention immediately.  "

## 2022-06-22 NOTE — PROGRESS NOTES
"Chief Complaint  Dizziness (Left ear )    Subjective        Raheel Mosqueda presents to [facility]  History of Present Illness     Dizziness.  Seems to be worse when he is on his left side in bed.  He will stand up and feel dizzy.  For very brief period of time.  Like things are spinning.  It will last 5 or 10 seconds at the most.  These episodes at nighttime been going on for a few months intermittently.  They been occurring a little more often recently.  Sometimes when he is painting at home and looks up to 1 side.  Other times when he is at work and looks up on his van.  They have never been incapacitating.    He has seborrheic dermatitis of the external ears bilaterally.  We also removed some wax in recent months.    No headache.  No troubles with coordination.  No trouble with ambulation.  He otherwise feels fine.  No ear pain.     Objective   Vital Signs:  /83   Pulse 59   Temp 97.5 °F (36.4 °C) (Temporal)   Ht 180.3 cm (70.98\")   Wt 106 kg (233 lb 9.6 oz)   SpO2 96%   BMI 32.60 kg/m²   Estimated body mass index is 32.6 kg/m² as calculated from the following:    Height as of this encounter: 180.3 cm (70.98\").    Weight as of this encounter: 106 kg (233 lb 9.6 oz).          Physical Exam  Vitals and nursing note reviewed.   Constitutional:       General: He is not in acute distress.     Appearance: He is well-developed.   HENT:      Ears:      Comments: The external canals reveal flaky erythematous dandruff.  No cerumen impaction.  The tympanic membranes are unremarkable.  Neck:      Comments: Neck full range of motion.  No adenopathy.  No mass.  Cardiovascular:      Rate and Rhythm: Normal rate and regular rhythm.      Heart sounds: Normal heart sounds.   Pulmonary:      Effort: Pulmonary effort is normal.      Breath sounds: Normal breath sounds.   Musculoskeletal:      Cervical back: Normal range of motion. No tenderness.   Lymphadenopathy:      Cervical: No " cervical adenopathy.   Skin:     General: Skin is warm and dry.   Neurological:      Comments: Neurological exam.  Pupils equal and reactive to light.  Extra ocular muscle movements intact all directions.  Face symmetric.  Gait unremarkable.  No ataxia.  No dysmetria.  Negative [place]-Hallpike.  No nystagmus.     Psychiatric:         Mood and Affect: Mood normal.        Result Review :                Assessment and Plan   Diagnoses and all orders for this visit:    1. Benign paroxysmal positional vertigo of left ear (Primary)    2. Seborrheic dermatitis    Other orders  -     clotrimazole (LOTRIMIN) 1 % cream; Apply 1 application topically to the appropriate area as directed 2 (Two) Times a Day.  Dispense: 30 g; Refill: 1  -     hydrocortisone 1 % cream; Apply 1 application topically to the appropriate area as directed 2 (Two) Times a Day.  Dispense: 30 g; Refill: 1        Probable mild and intermittent benign paroxysmal positional vertigo of the left inner ear.  See patient discharge instructions.  At this time no absolute indication for vestibular physical therapy.  He wants to hold off and try to watch himself.  He understands seek medical attention with very severe incapacitating symptoms.  At this time very likely not central vertigo.  No need for medication at this time.    Seborrheic dermatitis of the external ear canals.  I recommend a combination of clotrimazole and hydrocortisone cream applied gently couple times a day.  As needed.  I can also get him into an ENT doctor if it continues to be a problem.         Follow Up   No follow-ups on file.  Patient was given instructions and counseling regarding his condition or for health maintenance advice. Please see specific information pulled into the AVS if appropriate.

## 2022-07-12 RX ORDER — ATORVASTATIN CALCIUM 10 MG/1
10 TABLET, FILM COATED ORAL DAILY
Qty: 90 TABLET | Refills: 1 | Status: SHIPPED | OUTPATIENT
Start: 2022-07-12 | End: 2022-07-12

## 2022-07-12 RX ORDER — ATORVASTATIN CALCIUM 10 MG/1
10 TABLET, FILM COATED ORAL DAILY
Qty: 90 TABLET | Refills: 1 | Status: SHIPPED | OUTPATIENT
Start: 2022-07-12

## 2022-07-12 NOTE — TELEPHONE ENCOUNTER
Rx Refill Note  Requested Prescriptions     Pending Prescriptions Disp Refills   • atorvastatin (LIPITOR) 10 MG tablet 90 tablet 1     Sig: Take 1 tablet by mouth Daily.      Last office visit with prescribing clinician: 6/22/2022      Next office visit with prescribing clinician: 7/12/2022            Mireille Stephens  07/12/22, 16:23 EDT

## 2022-07-12 NOTE — TELEPHONE ENCOUNTER
Rx Refill Note  Requested Prescriptions     Pending Prescriptions Disp Refills   • atorvastatin (LIPITOR) 10 MG tablet [Pharmacy Med Name: ATORVASTATIN 10MG TABLETS] 90 tablet 1     Sig: TAKE 1 TABLET BY MOUTH DAILY      Last office visit with prescribing clinician: 6/22/2022      Next office visit with prescribing clinician: 8/18/2022            Mireille Stephens  07/12/22, 16:25 EDT

## 2022-08-18 ENCOUNTER — OFFICE VISIT (OUTPATIENT)
Dept: FAMILY MEDICINE CLINIC | Facility: CLINIC | Age: 57
End: 2022-08-18

## 2022-08-18 VITALS
SYSTOLIC BLOOD PRESSURE: 113 MMHG | TEMPERATURE: 96.6 F | HEIGHT: 71 IN | WEIGHT: 219.8 LBS | HEART RATE: 59 BPM | DIASTOLIC BLOOD PRESSURE: 77 MMHG | BODY MASS INDEX: 30.77 KG/M2 | OXYGEN SATURATION: 98 %

## 2022-08-18 DIAGNOSIS — Z00.00 HEALTH CARE MAINTENANCE: Primary | ICD-10-CM

## 2022-08-18 PROCEDURE — 99396 PREV VISIT EST AGE 40-64: CPT | Performed by: FAMILY MEDICINE

## 2022-08-18 NOTE — PROGRESS NOTES
"Chief Complaint  Annual Exam    Subjective        Raheel Mosqueda presents to [facility]  History of Present Illness     Here for annual healthcare maintenance visit.  Non-smoker.  No heavy alcohol use.  He is not watching his diet.  Eating less carbohydrates.  He is lost about 11 or 12 pounds in the last few months.  He feels good he states.  He continues on amlodipine and benazepril for his hypertension.  He continues on atorvastatin 10 mg a day and since starting it he has had a 40 to 50% drop in his LDL bad cholesterol.  He continues on Lexapro 20 mg a day for previous anxiety and states he does well with it and wants to continue it.  He has no concerns about his health otherwise.  No  symptoms.  No GI symptoms.    Objective   Vital Signs:  /77   Pulse 59   Temp 96.6 °F (35.9 °C) (Temporal)   Ht 180.3 cm (70.98\")   Wt 99.7 kg (219 lb 12.8 oz)   SpO2 98%   BMI 30.67 kg/m²   Estimated body mass index is 30.67 kg/m² as calculated from the following:    Height as of this encounter: 180.3 cm (70.98\").    Weight as of this encounter: 99.7 kg (219 lb 12.8 oz).          Physical Exam  Constitutional:       Appearance: Normal appearance.   HENT:      Head: Atraumatic.      Mouth/Throat:      Pharynx: Oropharynx is clear. No oropharyngeal exudate or posterior oropharyngeal erythema.   Eyes:      Conjunctiva/sclera: Conjunctivae normal.   Neck:      Thyroid: No thyroid mass, thyromegaly or thyroid tenderness.   Cardiovascular:      Rate and Rhythm: Normal rate and regular rhythm.      Pulses: Normal pulses.      Heart sounds: Normal heart sounds.   Pulmonary:      Effort: Pulmonary effort is normal.      Breath sounds: Normal breath sounds.   Abdominal:      General: Abdomen is flat. There is no distension.      Palpations: Abdomen is soft. There is no mass.      Tenderness: There is no abdominal tenderness.      Hernia: No hernia is present.   Genitourinary:     Comments: " Patient declined prostate exam  Musculoskeletal:         General: Normal range of motion.      Cervical back: Normal range of motion and neck supple. No muscular tenderness.   Lymphadenopathy:      Cervical: No cervical adenopathy.   Skin:     General: Skin is warm and dry.      Findings: No rash.   Neurological:      General: No focal deficit present.      Mental Status: He is alert and oriented to person, place, and time.   Psychiatric:         Mood and Affect: Mood normal.        Result Review :  The following data was reviewed by: Carl Mckinley MD on 08/18/2022:  Common labs    Common Labsle 2/7/22 2/7/22 8/11/22 8/11/22 8/11/22 8/11/22    0841 [id number] 0813 0813 0813 0813   Glucose 94  100 (A)      BUN 17  17      Creatinine 1.17  1.17      eGFR Non  Am 69        eGFR African Am 80        Sodium 141  140      Potassium 4.6  4.7      Chloride 100  101      Calcium 9.5  9.4      Total Protein 7.4  7.3      Albumin 4.6  4.6      Total Bilirubin 0.6  0.7      Alkaline Phosphatase 91  95      AST (SGOT) 26  27      ALT (SGPT) 24  27      WBC    8.4     Hemoglobin    14.3     Hematocrit    43.4     Platelets    239     Total Cholesterol  155   130    Triglycerides  87   70    HDL Cholesterol  49   40    LDL Cholesterol   90   76    PSA      0.8   (A) Abnormal value       Comments are available for some flowsheets but are not being displayed.                     Assessment and Plan   Diagnoses and all orders for this visit:    1. Health care maintenance (Primary)      Annual healthcare maintenance visit.    Immunizations.  I recommend the COVID 19/4 shot either now or this fall.  Patient wants to wait till this autumn.    Colon cancer screening up-to-date.    Prostate cancer screening.  No family history of prostate cancer.  No  symptoms.  And PSA less than 1.    Hypertension hyperlipidemia.  Stable.  Lipids improved.  No evidence of prediabetes at this time.  Glucose 100, but previous glucose levels  normal.    Preventative health practices discussed including regular exercise.    Anxiety depression.  In remission.  Continues Lexapro maintenance therapy.    I will see him back in 1 year for annual complete physical examination with lab work prior.  I will see him sooner as needed.         Follow Up   No follow-ups on file.  Patient was given instructions and counseling regarding his condition or for health maintenance advice. Please see specific information pulled into the AVS if appropriate.

## 2022-10-17 RX ORDER — ESCITALOPRAM OXALATE 20 MG/1
20 TABLET ORAL DAILY
Qty: 90 TABLET | Refills: 1 | Status: SHIPPED | OUTPATIENT
Start: 2022-10-17 | End: 2023-02-17

## 2022-10-17 NOTE — TELEPHONE ENCOUNTER
Rx Refill Note  Requested Prescriptions     Pending Prescriptions Disp Refills   • escitalopram (LEXAPRO) 20 MG tablet 90 tablet 1     Sig: Take 1 tablet by mouth Daily.      Last office visit with prescribing clinician: 8/18/2022      Next office visit with prescribing clinician: 8/24/2023            Mireille Stephens  10/17/22, 14:34 EDT

## 2022-10-27 DIAGNOSIS — I10 ESSENTIAL HYPERTENSION: ICD-10-CM

## 2022-10-27 RX ORDER — AMLODIPINE BESYLATE 10 MG/1
10 TABLET ORAL DAILY
Qty: 90 TABLET | Refills: 0 | Status: SHIPPED | OUTPATIENT
Start: 2022-10-27 | End: 2023-01-27

## 2022-10-27 RX ORDER — BENAZEPRIL HYDROCHLORIDE 20 MG/1
20 TABLET ORAL DAILY
Qty: 90 TABLET | Refills: 0 | Status: SHIPPED | OUTPATIENT
Start: 2022-10-27 | End: 2023-01-27

## 2022-10-27 NOTE — TELEPHONE ENCOUNTER
Rx Refill Note  Requested Prescriptions     Pending Prescriptions Disp Refills   • benazepril (LOTENSIN) 20 MG tablet [Pharmacy Med Name: BENAZEPRIL 20MG TABLETS] 90 tablet 1     Sig: TAKE 1 TABLET BY MOUTH DAILY   • amLODIPine (NORVASC) 10 MG tablet [Pharmacy Med Name: AMLODIPINE BESYLATE 10MG TABLETS] 90 tablet 1     Sig: TAKE 1 TABLET BY MOUTH DAILY      Last office visit with prescribing clinician: 8/18/2022      Next office visit with prescribing clinician: 8/24/2023            Mireille Stephens  10/27/22, 13:40 EDT

## 2022-11-03 ENCOUNTER — EMERGENCY (EMERGENCY)
Facility: HOSPITAL | Age: 57
LOS: 1 days | Discharge: HOME | End: 2022-11-03
Attending: EMERGENCY MEDICINE
Payer: MEDICAID

## 2022-11-03 VITALS
OXYGEN SATURATION: 97 % | SYSTOLIC BLOOD PRESSURE: 205 MMHG | HEART RATE: 91 BPM | RESPIRATION RATE: 18 BRPM | DIASTOLIC BLOOD PRESSURE: 115 MMHG | HEIGHT: 74 IN | TEMPERATURE: 98 F | WEIGHT: 210.1 LBS

## 2022-11-03 DIAGNOSIS — Z98.2 PRESENCE OF CEREBROSPINAL FLUID DRAINAGE DEVICE: Chronic | ICD-10-CM

## 2022-11-03 PROCEDURE — 99285 EMERGENCY DEPT VISIT HI MDM: CPT

## 2022-11-03 NOTE — ED ADULT TRIAGE NOTE - CHIEF COMPLAINT QUOTE
patient started having cough, congestion, body aches, chills and sweats last night. worsening today, now with difficulty breathing due to congetsion. dayquil taken 740pm. no fevers at home, no n/v/d. hx of brain shunt.

## 2022-11-03 NOTE — ED ADULT TRIAGE NOTE - HEART RATE (BEATS/MIN)
CHIEF COMPLAINT:  Annual physical     SUBJECTIVE:  Mr Kike Mo is a pleasant 62 year old  male seen today for a physical exam.  He is healthy.  No health concerns.      Health Maintenance Summary     Pneumococcal Vaccine 0-64 (1 of 4 - PCV13)  Overdue - never done    Shingles Vaccine (1 of 2)  Overdue - never done    Influenza Vaccine (1)  Overdue since 9/1/2021    COVID-19 Vaccine (2 - Booster for Benitez series)  Overdue since 9/29/2021    Depression Screening (Yearly)  Next due on 3/2/2023    Colorectal Cancer Screen- (Colonoscopy - Every 10 Years)  Next due on 11/15/2027    DTaP/Tdap/Td Vaccine (4 - Td or Tdap)  Next due on 10/28/2029    Hepatitis C Screening   Completed    Hepatitis B Vaccine   Aged Out    Meningococcal Vaccine   Aged Out    HPV Vaccine   Aged Out          ALLERGIES:  ALLERGIES:   Allergen Reactions   • Bee Sting ANAPHYLAXIS   • Ranitidine Other (See Comments)     Paranoia        CURRENT MEDICATIONS:  Current Outpatient Medications   Medication Sig Dispense Refill   • hydrochlorothiazide (HYDRODIURIL) 25 MG tablet Take 1 tablet by mouth daily. 90 tablet 1   • losartan (COZAAR) 50 MG tablet Take 1 tablet by mouth 2 times daily. Indications: High Blood Pressure Disorder 60 tablet 5   • omeprazole (PrilOSEC) 20 MG capsule Take 1 capsule by mouth daily. 90 capsule 1     No current facility-administered medications for this visit.        PAST MEDICAL HISTORY:  Past Medical History:   Diagnosis Date   • Anemia     iron deficiency   • Aneurysm of superior mesenteric artery (CMS/HCC)    • Benign essential HTN 12/14/2018   • Chronic pain     neck   • Dyslipidemia    • Gastroesophageal reflux disease    • Malignant neoplasm of posterior wall of urinary bladder (CMS/HCC)    • Osteoarthritis    • PONV (postoperative nausea and vomiting)    • Renal artery aneurysm (CMS/HCC)         PAST SURGICAL HISTORY:  Past Surgical History:   Procedure Laterality Date   • Abdominal aortic aneurysm repair,  open  2021    surgery for repair at [facility]   • Egd  2021    Dr. Kelly. Done with endoscopic ultrasound as well.   • Endoscopic ultrasound (upper)  2021    Dr. Kelly. Done with EGD as well.   • Hernia repair     • Toe surgery Left     Great toe   • Transurethral resection of bladder tumor  2021        FAMILY HISTORY:  Family History   Problem Relation Age of Onset   • Heart disease Mother    • High cholesterol Mother    • High blood pressure Mother    • Vascular Mother    • Cancer Father         liver   • Hyperlipidemia Father    • Stroke Father    • Diabetes Maternal Grandmother    • Heart Maternal Grandfather         MI   • Vascular Maternal Grandfather    • Heart Paternal Grandfather         MI   • Vascular Paternal Grandfather         SOCIAL HISTORY:  Social History     Socioeconomic History   • Marital status: /Civil Union     Spouse name: Not on file   • Number of children: Not on file   • Years of education: Not on file   • Highest education level: Not on file   Occupational History   • Not on file   Tobacco Use   • Smoking status: Former Smoker     Types: Cigarettes     Quit date: 10/10/1987     Years since quittin.4   • Smokeless tobacco: Never Used   Vaping Use   • Vaping Use: never used   Substance and Sexual Activity   • Alcohol use: Yes     Comment: socially    • Drug use: No   • Sexual activity: Yes     Partners: Female     Comment: He has 2 kids      Other Topics Concern   • Not on file   Social History Narrative   • Not on file     Social Determinants of Health     Financial Resource Strain: Not on file   Food Insecurity: Not on file   Transportation Needs: Not on file   Physical Activity: Not on file   Stress: Not on file   Social Connections: Not on file   Intimate Partner Violence: Not At Risk   • Social Determinants: Intimate Partner Violence Past Fear: No   • Social Determinants: Intimate Partner Violence Current Fear: No        REVIEW OF SYSTEMS:  General:   Denies fever, chills or weight changes.    Skin:  Denies any concerning lesions. No hair or nail concerns.  No bruising.  Eyes:  Denies blurry vision, double vision, glaucoma or cataracts.  ENT:  Denies hearing concerns, sinus congestion, rhinorrhea, difficulty swallowing or hoarse voice.    Cardiovascular:  Denies chest pains palpitations, leg pain or peripheral edema.  Respiratory:  Denies shortness of breath, wheezing, coughing or snoring.  Breasts:  Denies concerns.  GI:  Denies abdominal pain, nausea, vomiting, diarrhea, constipation, black stools or bleeding.  :  Denies urinary pain, frequency, urgency, bleeding or incontinence.  Denies obstructive urinary symptoms.  Gentitalia: Denies testicular pain, lumps or skin lesions.  No history of sexually transmitted infections.  No sexual dysfunction concerns.  Musculoskeletal: Denies any joint or muscle pain.  No unusual swelling or weakness.    Neurologic: Denies headache, dizziness, numbness, seizure or memory problems.  Psychological: Denies depression, anxiety, compulsions or erratic behavior.      OBJECTIVE:  Visit Vitals  /64 (BP Location: RUE - Right upper extremity, Patient Position: Sitting, Cuff Size: Regular)   Pulse 91   Resp 16   Ht 6' (1.829 m)   Wt 102.5 kg (225 lb 14.4 oz)   SpO2 97%   BMI 30.64 kg/m²          General:  Alert pleasant male in no acute distress.  Skin:  Warm, pink and dry.  Eyes:  PERRLA.  Sclera white.  Funduscopic red reflex noted.  Optic disc normal.  HENT:  Head: Normocephalic, atraumatic.  Ears: TM's intact.  Landmarks noted.  No erythema or drainage. External canals open. Hearing appropriate to conversation. Neck: Soft, supple and no lymphadenopathy.  Thyroid nonpalpable.  Cardiovascular:  Regular rate, normal S1, S2.  No murmur gallop or rub.  Radial, femoral and dorsalis pedis pulses 2+ bilaterally.  No peripheral edema.  Respiratory:  Lungs clear to auscultation.  No wheezes, rhonchi or crackles.  Breasts:  Soft, no  masses or tenderness.  GI/:  Abdomen soft,   Musculoskeletal:  Upper and lower extremities symmetric with equal strength 5/5 bilaterally.  Freely moving all 4 extremities.  No joint deformities.  Spine with normal curvature.  Neurologic:  Alert, oriented X3.   Psychologic: Alert, good eye contact, thoughts connected.  Dress and appearance appropriate.    LABS:   No visits with results within 1 Week(s) from this visit.   Latest known visit with results is:   Hospital Outpatient Visit on 12/17/2021   Component Date Value   • Creatinine 12/17/2021 1.10    • Glomerular Filtration Ra* 12/17/2021 72        ASSESSMENT:  1. Normal Physical Exam  2. Preventive immunizations    Depression Screening  Recent Review Flowsheet Data     Date 3/2/2022    Adult PHQ 2 Score 0    Adult PHQ 2 Interpretation No further screening needed    Little interest or pleasure in activity? Not at all    Feeling down, depressed or hopeless? Not at all    Adult PHQ 9 Score 0    Trouble falling or staying asleep or sleeping all the time? Not at all    Feeling tired or having little energy? Not at all    Poor appetite or overeating? Not at all    Feeling bad about yourself or that you are a failure or have let yourself or family down? Not at all    Trouble concentrating on things such as reading the newspaper or watching TV? Not at all    Moving or speaking slowly that other people have noticed or the opposite - being so fidgety or restless that you have been moving around a lot more than usual? Not at all    Thoughts that you would be better off dead or of hurting yourself in some way? Not at all    If you reported any problems, how difficult have these problems made it to do your work, take care of things at home, or get along with other people? Not difficult at all            PLAN:  · Discussed health and wellness.    · Recommend a low fat, low cholesterol, low salt, low refined sugar diet.   · Recommend exercise for at least 30 minutes most days  of the week.  Work on weight loss.    · Do a self testicular exam monthly.    · Encouraged alcohol and caffeine in moderation.   · He will be notified of all pending lab results.    · Recommend a yearly physical.      Orders Placed This Encounter   • PSA   • Lipid Panel With Reflex   • omeprazole (PrilOSEC) 20 MG capsule   • hydrochlorothiazide (HYDRODIURIL) 25 MG tablet        ALYSE Barajas  [facility] Center[facility]ON[facility]  [facility]  [address]  [place] [facility] [id number] [phone]   91

## 2022-11-04 VITALS
SYSTOLIC BLOOD PRESSURE: 121 MMHG | HEART RATE: 80 BPM | RESPIRATION RATE: 18 BRPM | DIASTOLIC BLOOD PRESSURE: 76 MMHG | TEMPERATURE: 98 F | OXYGEN SATURATION: 96 %

## 2022-11-04 LAB
ALBUMIN SERPL ELPH-MCNC: 4.5 G/DL — SIGNIFICANT CHANGE UP (ref 3.3–5.2)
ALP SERPL-CCNC: 100 U/L — SIGNIFICANT CHANGE UP (ref 40–120)
ALT FLD-CCNC: 18 U/L — SIGNIFICANT CHANGE UP
ANION GAP SERPL CALC-SCNC: 13 MMOL/L — SIGNIFICANT CHANGE UP (ref 5–17)
AST SERPL-CCNC: 15 U/L — SIGNIFICANT CHANGE UP
BASE EXCESS BLDV CALC-SCNC: 4.7 MMOL/L — HIGH (ref -2–3)
BASOPHILS # BLD AUTO: 0.04 K/UL — SIGNIFICANT CHANGE UP (ref 0–0.2)
BASOPHILS NFR BLD AUTO: 0.5 % — SIGNIFICANT CHANGE UP (ref 0–2)
BILIRUB SERPL-MCNC: <0.2 MG/DL — LOW (ref 0.4–2)
BUN SERPL-MCNC: 16.4 MG/DL — SIGNIFICANT CHANGE UP (ref 8–20)
CA-I SERPL-SCNC: 1.13 MMOL/L — LOW (ref 1.15–1.33)
CALCIUM SERPL-MCNC: 9 MG/DL — SIGNIFICANT CHANGE UP (ref 8.4–10.5)
CHLORIDE BLDV-SCNC: 103 MMOL/L — SIGNIFICANT CHANGE UP (ref 96–108)
CHLORIDE SERPL-SCNC: 103 MMOL/L — SIGNIFICANT CHANGE UP (ref 96–108)
CO2 SERPL-SCNC: 26 MMOL/L — SIGNIFICANT CHANGE UP (ref 22–29)
CREAT SERPL-MCNC: 1.2 MG/DL — SIGNIFICANT CHANGE UP (ref 0.5–1.3)
D DIMER BLD IA.RAPID-MCNC: <150 NG/ML DDU — SIGNIFICANT CHANGE UP
EGFR: 71 ML/MIN/1.73M2 — SIGNIFICANT CHANGE UP
EOSINOPHIL # BLD AUTO: 0.45 K/UL — SIGNIFICANT CHANGE UP (ref 0–0.5)
EOSINOPHIL NFR BLD AUTO: 6 % — SIGNIFICANT CHANGE UP (ref 0–6)
FLUAV AG NPH QL: SIGNIFICANT CHANGE UP
FLUBV AG NPH QL: SIGNIFICANT CHANGE UP
GAS PNL BLDV: 137 MMOL/L — SIGNIFICANT CHANGE UP (ref 136–145)
GAS PNL BLDV: SIGNIFICANT CHANGE UP
GAS PNL BLDV: SIGNIFICANT CHANGE UP
GLUCOSE BLDV-MCNC: 91 MG/DL — SIGNIFICANT CHANGE UP (ref 70–99)
GLUCOSE SERPL-MCNC: 88 MG/DL — SIGNIFICANT CHANGE UP (ref 70–99)
HCO3 BLDV-SCNC: 30 MMOL/L — HIGH (ref 22–29)
HCT VFR BLD CALC: 48.9 % — SIGNIFICANT CHANGE UP (ref 39–50)
HCT VFR BLDA CALC: 48 % — SIGNIFICANT CHANGE UP
HGB BLD CALC-MCNC: 15.9 G/DL — SIGNIFICANT CHANGE UP (ref 12.6–17.4)
HGB BLD-MCNC: 16.3 G/DL — SIGNIFICANT CHANGE UP (ref 13–17)
IMM GRANULOCYTES NFR BLD AUTO: 0.3 % — SIGNIFICANT CHANGE UP (ref 0–0.9)
LACTATE BLDV-MCNC: 0.9 MMOL/L — SIGNIFICANT CHANGE UP (ref 0.5–2)
LYMPHOCYTES # BLD AUTO: 2.83 K/UL — SIGNIFICANT CHANGE UP (ref 1–3.3)
LYMPHOCYTES # BLD AUTO: 37.8 % — SIGNIFICANT CHANGE UP (ref 13–44)
MAGNESIUM SERPL-MCNC: 1.9 MG/DL — SIGNIFICANT CHANGE UP (ref 1.8–2.6)
MCHC RBC-ENTMCNC: 28.1 PG — SIGNIFICANT CHANGE UP (ref 27–34)
MCHC RBC-ENTMCNC: 33.3 GM/DL — SIGNIFICANT CHANGE UP (ref 32–36)
MCV RBC AUTO: 84.2 FL — SIGNIFICANT CHANGE UP (ref 80–100)
MONOCYTES # BLD AUTO: 0.88 K/UL — SIGNIFICANT CHANGE UP (ref 0–0.9)
MONOCYTES NFR BLD AUTO: 11.7 % — SIGNIFICANT CHANGE UP (ref 2–14)
NEUTROPHILS # BLD AUTO: 3.27 K/UL — SIGNIFICANT CHANGE UP (ref 1.8–7.4)
NEUTROPHILS NFR BLD AUTO: 43.7 % — SIGNIFICANT CHANGE UP (ref 43–77)
NT-PROBNP SERPL-SCNC: 30 PG/ML — SIGNIFICANT CHANGE UP (ref 0–300)
PCO2 BLDV: 49 MMHG — SIGNIFICANT CHANGE UP (ref 42–55)
PH BLDV: 7.39 — SIGNIFICANT CHANGE UP (ref 7.32–7.43)
PLATELET # BLD AUTO: 274 K/UL — SIGNIFICANT CHANGE UP (ref 150–400)
PO2 BLDV: 55 MMHG — HIGH (ref 25–45)
POTASSIUM BLDV-SCNC: 4.3 MMOL/L — SIGNIFICANT CHANGE UP (ref 3.5–5.1)
POTASSIUM SERPL-MCNC: 4.2 MMOL/L — SIGNIFICANT CHANGE UP (ref 3.5–5.3)
POTASSIUM SERPL-SCNC: 4.2 MMOL/L — SIGNIFICANT CHANGE UP (ref 3.5–5.3)
PROT SERPL-MCNC: 7.5 G/DL — SIGNIFICANT CHANGE UP (ref 6.6–8.7)
RBC # BLD: 5.81 M/UL — HIGH (ref 4.2–5.8)
RBC # FLD: 13 % — SIGNIFICANT CHANGE UP (ref 10.3–14.5)
RSV RNA NPH QL NAA+NON-PROBE: SIGNIFICANT CHANGE UP
SAO2 % BLDV: 88.6 % — SIGNIFICANT CHANGE UP
SARS-COV-2 RNA SPEC QL NAA+PROBE: SIGNIFICANT CHANGE UP
SODIUM SERPL-SCNC: 142 MMOL/L — SIGNIFICANT CHANGE UP (ref 135–145)
TROPONIN T SERPL-MCNC: <0.01 NG/ML — SIGNIFICANT CHANGE UP (ref 0–0.06)
WBC # BLD: 7.49 K/UL — SIGNIFICANT CHANGE UP (ref 3.8–10.5)
WBC # FLD AUTO: 7.49 K/UL — SIGNIFICANT CHANGE UP (ref 3.8–10.5)

## 2022-11-04 PROCEDURE — 93010 ELECTROCARDIOGRAM REPORT: CPT

## 2022-11-04 PROCEDURE — 94640 AIRWAY INHALATION TREATMENT: CPT

## 2022-11-04 PROCEDURE — 85014 HEMATOCRIT: CPT

## 2022-11-04 PROCEDURE — 99236 HOSP IP/OBS SAME DATE HI 85: CPT

## 2022-11-04 PROCEDURE — 36415 COLL VENOUS BLD VENIPUNCTURE: CPT

## 2022-11-04 PROCEDURE — 82947 ASSAY GLUCOSE BLOOD QUANT: CPT

## 2022-11-04 PROCEDURE — 99284 EMERGENCY DEPT VISIT MOD MDM: CPT

## 2022-11-04 PROCEDURE — G0378: CPT

## 2022-11-04 PROCEDURE — 80053 COMPREHEN METABOLIC PANEL: CPT

## 2022-11-04 PROCEDURE — 84132 ASSAY OF SERUM POTASSIUM: CPT

## 2022-11-04 PROCEDURE — 83605 ASSAY OF LACTIC ACID: CPT

## 2022-11-04 PROCEDURE — 93005 ELECTROCARDIOGRAM TRACING: CPT

## 2022-11-04 PROCEDURE — 84484 ASSAY OF TROPONIN QUANT: CPT

## 2022-11-04 PROCEDURE — 87637 SARSCOV2&INF A&B&RSV AMP PRB: CPT

## 2022-11-04 PROCEDURE — 96365 THER/PROPH/DIAG IV INF INIT: CPT

## 2022-11-04 PROCEDURE — 82435 ASSAY OF BLOOD CHLORIDE: CPT

## 2022-11-04 PROCEDURE — 85018 HEMOGLOBIN: CPT

## 2022-11-04 PROCEDURE — 85025 COMPLETE CBC W/AUTO DIFF WBC: CPT

## 2022-11-04 PROCEDURE — 96375 TX/PRO/DX INJ NEW DRUG ADDON: CPT

## 2022-11-04 PROCEDURE — 82330 ASSAY OF CALCIUM: CPT

## 2022-11-04 PROCEDURE — 82803 BLOOD GASES ANY COMBINATION: CPT

## 2022-11-04 PROCEDURE — 71046 X-RAY EXAM CHEST 2 VIEWS: CPT

## 2022-11-04 PROCEDURE — 83880 ASSAY OF NATRIURETIC PEPTIDE: CPT

## 2022-11-04 PROCEDURE — 83735 ASSAY OF MAGNESIUM: CPT

## 2022-11-04 PROCEDURE — 71046 X-RAY EXAM CHEST 2 VIEWS: CPT | Mod: 26

## 2022-11-04 PROCEDURE — 85379 FIBRIN DEGRADATION QUANT: CPT

## 2022-11-04 PROCEDURE — 84295 ASSAY OF SERUM SODIUM: CPT

## 2022-11-04 PROCEDURE — 99285 EMERGENCY DEPT VISIT HI MDM: CPT | Mod: 25

## 2022-11-04 RX ORDER — ASPIRIN/CALCIUM CARB/MAGNESIUM 324 MG
81 TABLET ORAL DAILY
Refills: 0 | Status: DISCONTINUED | OUTPATIENT
Start: 2022-11-04 | End: 2022-11-11

## 2022-11-04 RX ORDER — BUDESONIDE AND FORMOTEROL FUMARATE DIHYDRATE 160; 4.5 UG/1; UG/1
2 AEROSOL RESPIRATORY (INHALATION)
Qty: 1 | Refills: 0
Start: 2022-11-04 | End: 2022-11-13

## 2022-11-04 RX ORDER — BUDESONIDE AND FORMOTEROL FUMARATE DIHYDRATE 160; 4.5 UG/1; UG/1
2 AEROSOL RESPIRATORY (INHALATION)
Refills: 0 | Status: DISCONTINUED | OUTPATIENT
Start: 2022-11-04 | End: 2022-11-07

## 2022-11-04 RX ORDER — OXYCODONE HYDROCHLORIDE 5 MG/1
5 TABLET ORAL EVERY 6 HOURS
Refills: 0 | Status: DISCONTINUED | OUTPATIENT
Start: 2022-11-04 | End: 2022-11-04

## 2022-11-04 RX ORDER — MAGNESIUM SULFATE 500 MG/ML
2 VIAL (ML) INJECTION ONCE
Refills: 0 | Status: COMPLETED | OUTPATIENT
Start: 2022-11-04 | End: 2022-11-04

## 2022-11-04 RX ORDER — IPRATROPIUM/ALBUTEROL SULFATE 18-103MCG
3 AEROSOL WITH ADAPTER (GRAM) INHALATION ONCE
Refills: 0 | Status: COMPLETED | OUTPATIENT
Start: 2022-11-04 | End: 2022-11-04

## 2022-11-04 RX ORDER — GABAPENTIN 400 MG/1
100 CAPSULE ORAL THREE TIMES A DAY
Refills: 0 | Status: DISCONTINUED | OUTPATIENT
Start: 2022-11-04 | End: 2022-11-11

## 2022-11-04 RX ORDER — ALBUTEROL 90 UG/1
2 AEROSOL, METERED ORAL EVERY 6 HOURS
Refills: 0 | Status: DISCONTINUED | OUTPATIENT
Start: 2022-11-04 | End: 2022-11-07

## 2022-11-04 RX ORDER — ALBUTEROL 90 UG/1
2 AEROSOL, METERED ORAL
Qty: 1 | Refills: 0
Start: 2022-11-04 | End: 2022-11-08

## 2022-11-04 RX ORDER — SODIUM CHLORIDE 9 MG/ML
1000 INJECTION INTRAMUSCULAR; INTRAVENOUS; SUBCUTANEOUS ONCE
Refills: 0 | Status: COMPLETED | OUTPATIENT
Start: 2022-11-04 | End: 2022-11-04

## 2022-11-04 RX ORDER — IPRATROPIUM/ALBUTEROL SULFATE 18-103MCG
3 AEROSOL WITH ADAPTER (GRAM) INHALATION
Refills: 0 | Status: DISCONTINUED | OUTPATIENT
Start: 2022-11-04 | End: 2022-11-11

## 2022-11-04 RX ORDER — ACETAMINOPHEN 500 MG
650 TABLET ORAL EVERY 6 HOURS
Refills: 0 | Status: DISCONTINUED | OUTPATIENT
Start: 2022-11-04 | End: 2022-11-11

## 2022-11-04 RX ADMIN — Medication 3 MILLILITER(S): at 02:42

## 2022-11-04 RX ADMIN — SODIUM CHLORIDE 1000 MILLILITER(S): 9 INJECTION INTRAMUSCULAR; INTRAVENOUS; SUBCUTANEOUS at 02:23

## 2022-11-04 RX ADMIN — Medication 3 MILLILITER(S): at 00:50

## 2022-11-04 RX ADMIN — Medication 150 GRAM(S): at 01:07

## 2022-11-04 RX ADMIN — Medication 2 GRAM(S): at 02:23

## 2022-11-04 RX ADMIN — Medication 81 MILLIGRAM(S): at 03:04

## 2022-11-04 RX ADMIN — Medication 40 MILLIGRAM(S): at 01:07

## 2022-11-04 RX ADMIN — GABAPENTIN 100 MILLIGRAM(S): 400 CAPSULE ORAL at 05:42

## 2022-11-04 RX ADMIN — Medication 3 MILLILITER(S): at 02:23

## 2022-11-04 NOTE — ED CDU PROVIDER INITIAL DAY NOTE - PROGRESS NOTE DETAILS
Pt feeling improved after nebs. Sat remains >92. Pending pulmonary consult for the morning. Resident Meenakshi Castro: Reassessed patient, symptoms improved with medications. Discussed lab results, pulm recs, outpatient meds and f/u. Answered all questions. Resident Meenakshi Castro: spoke to pulmonology, they recommend a d-dimer, if normal can d/c with symbicort and prednisone, pulm f/u outpatient.
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
## 2022-11-04 NOTE — CONSULT NOTE ADULT - ASSESSMENT
- possible acute exacerbation of undiagnosed COPD/asthma, likely viral induced (eos [id number])   - 30 pack year smoker  - hx of  shunt   - psoriatic arthritis    plan  - please obtain d dimer, if elevated can get CTA chest  - patient clinically improve with nebs, agree with empiric treatment for possible copd/asthma exacerbation  - obtain sputum culture  - short course of azithromycin given patient noted to have increase sputum production, SOB, and cough   - prednisone 40 mg daily for 5 days  - duoneb q8h standing, and albuterol Q4h PRN   - prior to discharge, please start patient symbicort 120/4.5 2 puff BID, and abluterol MDI PRN   - smoking cessation counseled   - patient will need outpatient pulmonary follow up in 1-2 weeks   - thank you for the consul t    Jonathan Horn M.D.  Attending Physician  [facility]   Pulmonary & Critical Care Medicine

## 2022-11-04 NOTE — CONSULT NOTE ADULT - SUBJECTIVE AND OBJECTIVE BOX
Patient is a 57y old  Male who presents with a chief complaint of     BRIEF HOSPITAL COURSE:   56 y/o M [facility] hx of TBI with left crani, s/p  shunt 2010, 30+ pack year active smoker, psoriatic arthritis, moderate SHAHZAD - AHI 18 not on cpap, present with shortness of breath.  Patient reports he develops cough for 2 days, and have yellow sputum.  Patient present to the ED because of worsening dyspnea and chest tightness. In the ED, patient was found wheezing, s/p solumedrol 40 mg 1x, 2 g of mag, duoneb, and 1L NS bolus, patient reports feeling better.      Patient reports that he is still actively smoking, down to 1 cig a day, but he was smoking since teenager years.    patient lives with his wife.  they ahve a house in [place], but most of his doctor in [place] per patient.    baseline patient walk with > 10 blocks, does not report any dyspnea on exertion.   denies fever, chill, chest pain, abdomoinal pain, n/v.         PAST MEDICAL & SURGICAL HISTORY:  TBI (traumatic brain injury)  short term memory lose   also shunt      Reflux      Seizure      Ventricular shunt in place        Allergies    Keflex (Hives)    Intolerances      FAMILY HISTORY:  No pertinent family history in first degree relatives        Family history otherwise noncontributory.    Social History:see HPI     Review of Systems:  negative except as above   ALL OTHER REVIEW OF SYSTEMS EXCEPT PER HPI NEGATIVE.      Medications:      ALBUTerol    90 MICROgram(s) HFA Inhaler 2 Puff(s) Inhalation every 6 hours PRN  albuterol/ipratropium for Nebulization.. 3 milliLiter(s) Nebulizer every 20 minutes  budesonide 160 MICROgram(s)/formoterol 4.5 MICROgram(s) Inhaler 2 Puff(s) Inhalation two times a day    acetaminophen     Tablet .. 650 milliGRAM(s) Oral every 6 hours PRN  gabapentin 100 milliGRAM(s) Oral three times a day  oxyCODONE    IR 5 milliGRAM(s) Oral every 6 hours PRN      aspirin  chewable 81 milliGRAM(s) Oral daily        predniSONE   Tablet 50 milliGRAM(s) Oral once                  ICU Vital Signs Last 24 Hrs  T(C): 36.6 (04 Nov 2022 07:54), Max: 36.6 (04 Nov 2022 07:54)  T(F): 97.8 (04 Nov 2022 07:54), Max: 97.8 (04 Nov 2022 07:54)  HR: 80 (04 Nov 2022 07:54) (80 - 91)  BP: 121/76 (04 Nov 2022 07:54) (121/76 - 205/115)  BP(mean): --  ABP: --  ABP(mean): --  RR: 18 (04 Nov 2022 07:54) (18 - 18)  SpO2: 96% (04 Nov 2022 07:54) (96% - 97%)    O2 Parameters below as of 04 Nov 2022 07:54  Patient On (Oxygen Delivery Method): room air          Vital Signs Last 24 Hrs  T(C): 36.6 (04 Nov 2022 07:54), Max: 36.6 (04 Nov 2022 07:54)  T(F): 97.8 (04 Nov 2022 07:54), Max: 97.8 (04 Nov 2022 07:54)  HR: 80 (04 Nov 2022 07:54) (80 - 91)  BP: 121/76 (04 Nov 2022 07:54) (121/76 - 205/115)  BP(mean): --  RR: 18 (04 Nov 2022 07:54) (18 - 18)  SpO2: 96% (04 Nov 2022 07:54) (96% - 97%)    Parameters below as of 04 Nov 2022 07:54  Patient On (Oxygen Delivery Method): room air            I&O's Detail        LABS:                        16.3   7.49  )-----------( 274      ( 04 Nov 2022 00:50 )             48.9     11-04    142  |  103  |  16.4  ----------------------------<  88  4.2   |  26.0  |  1.20    Ca    9.0      04 Nov 2022 00:50  Mg     1.9     11-04    TPro  7.5  /  Alb  4.5  /  TBili  <0.2<L>  /  DBili  x   /  AST  15  /  ALT  18  /  AlkPhos  100  11-04      CARDIAC MARKERS ( 04 Nov 2022 00:50 )  x     / <0.01 ng/mL / x     / x     / x          CAPILLARY BLOOD GLUCOSE            CULTURES:      Physical Examination:  GENERAL: In NAD   HEENT: NC/AT  NECK: Supple, trachea midline  PULM: CT b/l   CVS: +S1, S2, RRR  ABD: Soft, non-tender  EXTREMITIES: No pedal edema B/L  SKIN: No open wounds  NEURO: Grossly non-focal    DEVICES:     RADIOLOGY:   CXR

## 2022-11-04 NOTE — ED ADULT NURSE REASSESSMENT NOTE - NS ED NURSE REASSESS COMMENT FT1
Pt received from MEME Gilbert- and assumed care @ 1145 hrs.   Patient found in a  left lateral recum position of comfort Patient is A&Ox4 denies any pain or discomfort  Denies SOB no increased work of breathing  Physical assessment reveals: (skin DCAPBTLS)   IV is in place  and patient.  Plan of care explained and reviewed with patient, call bell within reach.
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
## 2022-11-04 NOTE — CONSULT NOTE ADULT - TIME BILLING
greater than 50% of time spent reviewing labs, notes, orders and radiographs, coordinating care  discussed with nursing, primary team.

## 2022-11-04 NOTE — ED CDU PROVIDER DISPOSITION NOTE - CARE PROVIDER_API CALL
Jason Lane)  Critical Care Medicine; Internal Medicine; Pulmonary Disease  [address] [id number]  Phone: [phone]  Fax: [phone]  Follow Up Time:
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
## 2022-11-04 NOTE — ED CDU PROVIDER DISPOSITION NOTE - NSFOLLOWUPINSTRUCTIONS_ED_ALL_ED_FT
1. Use the symbicort twice per day.  2. Use the albuterol inhaler as needed for shortness of breath.  3. Follow up with pulmonology at the next available appointment.   4. Return to the ED for any new or worsening symptoms.
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
## 2022-11-04 NOTE — ED CDU PROVIDER DISPOSITION NOTE - PATIENT PORTAL LINK FT
You can access the FollowMyHealth Patient Portal offered by [facility] by registering at the following website: [email or web address]. By joining [facility]’s FollowMyHealth portal, you will also be able to view your health information using other applications (apps) compatible with our system.

## 2022-11-04 NOTE — ED ADULT NURSE NOTE - NS ED NOTE ABUSE RESPONSE YN
Patient Information     Patient Name  Juana Baisn MRN  [id number] Sex  Female   1963      Letter by Josefina Castillo MD at      Author:  Josefina Castillo MD Service:  (none) Author Type:  (none)    Filed:   Encounter Date:  2017 Status:  (Other)           Juana Bains  [address] [facility] [address]          2017    Dear Ms. Bains:    A LIMITED refill of DULoxetine (CYMBALTA) 60 mg Delayed-release capsule and lisinopril-hydrochlorothiazide, 20-25 mg, (PRINZIDE, ZESTORETIC) 20-25 mg per tablet has been called into your pharmacy.    Additional refills require a medication management appointment with Josefina Castillo MD. Please call the clinic at [phone] to schedule your appointment.    Thank you,    The Refill Nurse  [facility]            Yes

## 2022-11-04 NOTE — ED PROVIDER NOTE - NS ED ATTENDING STATEMENT MOD
This was a shared visit with the KAROLINA. I reviewed and verified the documentation and independently performed the documented:
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
## 2023-01-20 ENCOUNTER — OFFICE VISIT (OUTPATIENT)
Dept: FAMILY MEDICINE CLINIC | Facility: CLINIC | Age: 58
End: 2023-01-20
Payer: COMMERCIAL

## 2023-01-20 VITALS
WEIGHT: 216 LBS | HEIGHT: 71 IN | DIASTOLIC BLOOD PRESSURE: 74 MMHG | TEMPERATURE: 98.6 F | OXYGEN SATURATION: 97 % | BODY MASS INDEX: 30.24 KG/M2 | SYSTOLIC BLOOD PRESSURE: 110 MMHG | RESPIRATION RATE: 18 BRPM | HEART RATE: 62 BPM

## 2023-01-20 DIAGNOSIS — H92.02 DISCOMFORT OF LEFT EAR: Primary | ICD-10-CM

## 2023-01-20 PROCEDURE — 99213 OFFICE O/P EST LOW 20 MIN: CPT | Performed by: FAMILY MEDICINE

## 2023-01-20 RX ORDER — AMOXICILLIN AND CLAVULANATE POTASSIUM 500; 125 MG/1; MG/1
1 TABLET, FILM COATED ORAL 2 TIMES DAILY
Qty: 20 TABLET | Refills: 0 | Status: SHIPPED | OUTPATIENT
Start: 2023-01-20 | End: 2023-01-30

## 2023-01-20 RX ORDER — CIPROFLOXACIN AND DEXAMETHASONE 3; 1 MG/ML; MG/ML
4 SUSPENSION/ DROPS AURICULAR (OTIC) 2 TIMES DAILY
Qty: 7.5 ML | Refills: 0 | Status: SHIPPED | OUTPATIENT
Start: 2023-01-20 | End: 2023-01-27

## 2023-01-20 NOTE — PROGRESS NOTES
"Chief Complaint  Chief Complaint   Patient presents with   • Ear Fullness     Pt c/o ongoing ear fullness and pressure      Subjective    History of Present Illness        Raheel Mosqueda presents to [facility] for   History of Present Illness  The patient is a 57-year-old male who presents with complaints of left ear congestion, pressure, and tinnitus. He is accompanied by an adult female.    Left ear congestion  He reports that he is experiencing issues with his left ear including symptoms of pressure, tinnitus, and feeling of it being clogged. He states that he has had some ear issues off and on for the past couple of years. He notes That he had a severe head cold a couple of years ago and since then has been dealing with persistent symptoms of drainage. He adds that at one point, Dr. Mckinley discussed the possiblity of trying a steroid to treat his symptoms and he did prescribe him some ear drops and recommend washing his left ear out with head and shoulders shampoo. He reports that he poured some peroxide into his ear and it did not cause pain, but it bubbled and ever since then he has been experiencing a muffling of his hearing. He notes that when he yawns he hears a popping.          Objective   Vital Signs:   Visit Vitals  /74   Pulse 62   Temp 98.6 °F (37 °C)   Resp 18   Ht 180.3 cm (70.98\")   Wt 98 kg (216 lb)   SpO2 97%   BMI 30.14 kg/m²       BMI is >= 30 and <35. (Class 1 Obesity). The following options were offered after discussion;: weight loss educational material (shared in after visit summary)     Physical Exam  Vitals reviewed.   Constitutional:       Appearance: He is well-developed.   HENT:      Head: Normocephalic.      Right Ear: External ear normal.      Left Ear: External ear normal.      Nose: Nose normal.   Eyes:      Conjunctiva/sclera: Conjunctivae normal.   Cardiovascular:      Rate and Rhythm: Normal rate and regular rhythm.   Pulmonary:      Effort: " Pulmonary effort is normal.      Breath sounds: Normal breath sounds.   Musculoskeletal:         General: Normal range of motion.      Cervical back: Normal range of motion and neck supple.   Skin:     General: Skin is warm and dry.      Capillary Refill: Capillary refill takes less than 2 seconds.   Neurological:      Mental Status: He is alert and oriented to person, place, and time.            Result Review :                      Assessment and Plan      Diagnoses and all orders for this visit:    1. Discomfort of left ear (Primary)  Assessment & Plan:  - The patient will begin using Ciprodex ear drops as prescribed.  - Prescription sent for patient to begin Augmentin.  - If no improvement in 2 weeks, he will return and we will refer him to otolaryngology for consultation.    Orders:  -     ciprofloxacin-dexamethasone (Ciprodex) 0.3-0.1 % otic suspension; Administer 4 drops into the left ear 2 (Two) Times a Day for 7 days.  Dispense: 7.5 mL; Refill: 0  -     amoxicillin-clavulanate (Augmentin) 500-125 MG per tablet; Take 1 tablet by mouth 2 (Two) Times a Day for 10 days.  Dispense: 20 tablet; Refill: 0           Follow Up   No follow-ups on file.  Patient was given instructions and counseling regarding his condition or for health maintenance advice. Please see specific information pulled into the AVS if appropriate.     Transcribed from ambient dictation for Barrera Desai Sr, MD by Miladis Gonzalez.  01/20/23   11:23 EST    Patient or patient representative verbalized consent to the visit recording.  I have personally performed the services described in this document as transcribed by the above individual, and it is both accurate and complete.

## 2023-01-20 NOTE — ASSESSMENT & PLAN NOTE
- The patient will begin using Ciprodex ear drops as prescribed.  - Prescription sent for patient to begin Augmentin.  - If no improvement in 2 weeks, he will return and we will refer him to otolaryngology for consultation.

## 2023-01-27 DIAGNOSIS — I10 ESSENTIAL HYPERTENSION: ICD-10-CM

## 2023-01-27 RX ORDER — BENAZEPRIL HYDROCHLORIDE 20 MG/1
20 TABLET ORAL DAILY
Qty: 90 TABLET | Refills: 0 | Status: SHIPPED | OUTPATIENT
Start: 2023-01-27

## 2023-01-27 RX ORDER — AMLODIPINE BESYLATE 10 MG/1
10 TABLET ORAL DAILY
Qty: 90 TABLET | Refills: 0 | Status: SHIPPED | OUTPATIENT
Start: 2023-01-27

## 2023-02-17 ENCOUNTER — OFFICE VISIT (OUTPATIENT)
Dept: FAMILY MEDICINE CLINIC | Facility: CLINIC | Age: 58
End: 2023-02-17
Payer: COMMERCIAL

## 2023-02-17 VITALS
DIASTOLIC BLOOD PRESSURE: 79 MMHG | HEART RATE: 62 BPM | BODY MASS INDEX: 29.82 KG/M2 | SYSTOLIC BLOOD PRESSURE: 120 MMHG | OXYGEN SATURATION: 99 % | TEMPERATURE: 97.3 F | WEIGHT: 213 LBS | HEIGHT: 71 IN

## 2023-02-17 DIAGNOSIS — F41.9 ANXIETY: ICD-10-CM

## 2023-02-17 DIAGNOSIS — T88.7XXA MEDICATION SIDE EFFECT: Primary | ICD-10-CM

## 2023-02-17 PROCEDURE — 99213 OFFICE O/P EST LOW 20 MIN: CPT | Performed by: FAMILY MEDICINE

## 2023-02-17 RX ORDER — ESCITALOPRAM OXALATE 10 MG/1
20 TABLET ORAL DAILY
Qty: 180 TABLET | Refills: 1 | Status: SHIPPED | OUTPATIENT
Start: 2023-02-17

## 2023-02-17 NOTE — PATIENT INSTRUCTIONS
Because of the ejaculatory side effects of the 20 mg of Lexapro, I am recommending that you go to a much lower dose but weaned off at a very slow schedule.    I do not believe adding bupropion at this point or switching to bupropion at this point is going to help matters, especially given that you mostly have anxiety and not depression.  Bupropion typically does not help anxiety.    I am switching you over from 20 mg of Lexapro a day to 10 mg tablets.    For the next 10 days I want you to alternate 20 mg and 15 mg every other day.    For the next 10 days I want you to take 15 mg daily    For the 10 days after that I want you to alternate 15 and 10 every other day.    And then after that 10 mg daily and we will keep you at that dose.    Once you are at 10 mg a day you may notice increasing anxiety at times.  I would recommend you work through that through breathing exercises/mindfulness meditation/exercise.  In particular mindfulness meditation, there are very good apps.  You will need to be at your steady lower dose for 3 weeks before we know how your mood is going to be.    If you find that 10 mg of Lexapro a day you are still having anxiety and may be some depression symptoms after a few weeks, we could then add bupropion.    You can also slow down the weaning by using smaller reductions.

## 2023-02-17 NOTE — PROGRESS NOTES
"Chief Complaint  Erectile Dysfunction (LEXAPRO )    Subjective        Raheel Mosqueda presents to [facility]  History of Present Illness     Ejaculatory dysfunction.  On Lexapro.  No  symptoms of concern.  No dysuria.  No hematuria.  PSA test normal in recent months.  He states he will have trouble getting to climax with no trouble with erectile dysfunction.  He has been on the Lexapro for anxiety symptoms for the better part of 8 years.  But the symptoms are worse lately.  In the past he has cut back on the dosing, but then he had anxiety symptoms which made him restart to a higher dose.  The lower doses did not help his sexual dysfunction.  He is having no depression symptoms.  Objective   Vital Signs:  /79   Pulse 62   Temp 97.3 °F (36.3 °C) (Temporal)   Ht 180.3 cm (70.98\")   Wt 96.6 kg (213 lb)   SpO2 99%   BMI 29.72 kg/m²   Estimated body mass index is 29.72 kg/m² as calculated from the following:    Height as of this encounter: 180.3 cm (70.98\").    Weight as of this encounter: 96.6 kg (213 lb).             Physical Exam  Constitutional:       Appearance: Normal appearance.   Cardiovascular:      Rate and Rhythm: Normal rate.   Pulmonary:      Effort: Pulmonary effort is normal.   Neurological:      Mental Status: He is alert.   Psychiatric:         Mood and Affect: Mood normal.        Result Review :                   Assessment and Plan   Diagnoses and all orders for this visit:    1. Medication side effect (Primary)    2. Anxiety    Other orders  -     escitalopram (LEXAPRO) 10 MG tablet; Take 2 tablets by mouth Daily. OR PER WEANING SCHEDULE  Dispense: 180 tablet; Refill: 1      Medication side effect.  Previous anxiety.  Ejaculatory dysfunction from 20 mg of Lexapro.  I am recommending a slow wean with close observation.  See discharge instructions.  Eventually want to see how he stabilizes on 10 mg a day.  This should help his symptoms.  Although he will " likely have some ejaculatory dysfunction.  If still having trouble on 10 mg, we might want to add and then switch over to bupropion.  But that may not help his anxiety.  And could make the anxiety worse.  Otherwise we will see him back as scheduled.         Follow Up   No follow-ups on file.  Patient was given instructions and counseling regarding his condition or for health maintenance advice. Please see specific information pulled into the AVS if appropriate.       Answers for HPI/ROS submitted by the patient on 2/10/2023  What is the primary reason for your visit?: Other  Please describe your symptoms.: Delayed ejaculation from the Lexapro medicine.  Have you had these symptoms before?: No  How long have you been having these symptoms?: Greater than 2 weeks  Please list any medications you are currently taking for this condition.: Lexapro

## 2023-02-18 ENCOUNTER — EMERGENCY (EMERGENCY)
Facility: HOSPITAL | Age: 58
LOS: 1 days | Discharge: HOME | End: 2023-02-18
Attending: EMERGENCY MEDICINE
Payer: MEDICAID

## 2023-02-18 VITALS
SYSTOLIC BLOOD PRESSURE: 159 MMHG | HEIGHT: 74 IN | HEART RATE: 95 BPM | OXYGEN SATURATION: 98 % | TEMPERATURE: 100 F | RESPIRATION RATE: 16 BRPM | DIASTOLIC BLOOD PRESSURE: 90 MMHG | WEIGHT: 210.1 LBS

## 2023-02-18 VITALS
HEART RATE: 99 BPM | TEMPERATURE: 99 F | RESPIRATION RATE: 20 BRPM | SYSTOLIC BLOOD PRESSURE: 162 MMHG | OXYGEN SATURATION: 98 % | DIASTOLIC BLOOD PRESSURE: 80 MMHG

## 2023-02-18 DIAGNOSIS — Z98.2 PRESENCE OF CEREBROSPINAL FLUID DRAINAGE DEVICE: Chronic | ICD-10-CM

## 2023-02-18 LAB
ALBUMIN SERPL ELPH-MCNC: 4.2 G/DL — SIGNIFICANT CHANGE UP (ref 3.3–5.2)
ALP SERPL-CCNC: 92 U/L — SIGNIFICANT CHANGE UP (ref 40–120)
ALT FLD-CCNC: 19 U/L — SIGNIFICANT CHANGE UP
ANION GAP SERPL CALC-SCNC: 11 MMOL/L — SIGNIFICANT CHANGE UP (ref 5–17)
AST SERPL-CCNC: 20 U/L — SIGNIFICANT CHANGE UP
BASOPHILS # BLD AUTO: 0.05 K/UL — SIGNIFICANT CHANGE UP (ref 0–0.2)
BASOPHILS NFR BLD AUTO: 0.9 % — SIGNIFICANT CHANGE UP (ref 0–2)
BILIRUB SERPL-MCNC: <0.2 MG/DL — LOW (ref 0.4–2)
BUN SERPL-MCNC: 8.3 MG/DL — SIGNIFICANT CHANGE UP (ref 8–20)
CALCIUM SERPL-MCNC: 9.4 MG/DL — SIGNIFICANT CHANGE UP (ref 8.4–10.5)
CHLORIDE SERPL-SCNC: 101 MMOL/L — SIGNIFICANT CHANGE UP (ref 96–108)
CO2 SERPL-SCNC: 24 MMOL/L — SIGNIFICANT CHANGE UP (ref 22–29)
CREAT SERPL-MCNC: 1.08 MG/DL — SIGNIFICANT CHANGE UP (ref 0.5–1.3)
EGFR: 80 ML/MIN/1.73M2 — SIGNIFICANT CHANGE UP
EOSINOPHIL # BLD AUTO: 0 K/UL — SIGNIFICANT CHANGE UP (ref 0–0.5)
EOSINOPHIL NFR BLD AUTO: 0 % — SIGNIFICANT CHANGE UP (ref 0–6)
GIANT PLATELETS BLD QL SMEAR: PRESENT — SIGNIFICANT CHANGE UP
GLUCOSE SERPL-MCNC: 97 MG/DL — SIGNIFICANT CHANGE UP (ref 70–99)
HCT VFR BLD CALC: 46 % — SIGNIFICANT CHANGE UP (ref 39–50)
HGB BLD-MCNC: 15.5 G/DL — SIGNIFICANT CHANGE UP (ref 13–17)
LYMPHOCYTES # BLD AUTO: 0.71 K/UL — LOW (ref 1–3.3)
LYMPHOCYTES # BLD AUTO: 12.1 % — LOW (ref 13–44)
MANUAL SMEAR VERIFICATION: SIGNIFICANT CHANGE UP
MCHC RBC-ENTMCNC: 28 PG — SIGNIFICANT CHANGE UP (ref 27–34)
MCHC RBC-ENTMCNC: 33.7 GM/DL — SIGNIFICANT CHANGE UP (ref 32–36)
MCV RBC AUTO: 83 FL — SIGNIFICANT CHANGE UP (ref 80–100)
MONOCYTES # BLD AUTO: 1.06 K/UL — HIGH (ref 0–0.9)
MONOCYTES NFR BLD AUTO: 18.1 % — HIGH (ref 2–14)
NEUTROPHILS # BLD AUTO: 3.84 K/UL — SIGNIFICANT CHANGE UP (ref 1.8–7.4)
NEUTROPHILS NFR BLD AUTO: 64.6 % — SIGNIFICANT CHANGE UP (ref 43–77)
NEUTS BAND # BLD: 0.9 % — SIGNIFICANT CHANGE UP (ref 0–8)
PLAT MORPH BLD: NORMAL — SIGNIFICANT CHANGE UP
PLATELET # BLD AUTO: 232 K/UL — SIGNIFICANT CHANGE UP (ref 150–400)
POTASSIUM SERPL-MCNC: 4.1 MMOL/L — SIGNIFICANT CHANGE UP (ref 3.5–5.3)
POTASSIUM SERPL-SCNC: 4.1 MMOL/L — SIGNIFICANT CHANGE UP (ref 3.5–5.3)
PROT SERPL-MCNC: 7.5 G/DL — SIGNIFICANT CHANGE UP (ref 6.6–8.7)
RAPID RVP RESULT: DETECTED
RBC # BLD: 5.54 M/UL — SIGNIFICANT CHANGE UP (ref 4.2–5.8)
RBC # FLD: 13.1 % — SIGNIFICANT CHANGE UP (ref 10.3–14.5)
RBC BLD AUTO: ABNORMAL
SARS-COV-2 RNA SPEC QL NAA+PROBE: DETECTED
SMUDGE CELLS # BLD: PRESENT — SIGNIFICANT CHANGE UP
SODIUM SERPL-SCNC: 136 MMOL/L — SIGNIFICANT CHANGE UP (ref 135–145)
VARIANT LYMPHS # BLD: 3.4 % — SIGNIFICANT CHANGE UP (ref 0–6)
WBC # BLD: 5.86 K/UL — SIGNIFICANT CHANGE UP (ref 3.8–10.5)
WBC # FLD AUTO: 5.86 K/UL — SIGNIFICANT CHANGE UP (ref 3.8–10.5)

## 2023-02-18 PROCEDURE — 0225U NFCT DS DNA&RNA 21 SARSCOV2: CPT

## 2023-02-18 PROCEDURE — 99284 EMERGENCY DEPT VISIT MOD MDM: CPT

## 2023-02-18 PROCEDURE — 74018 RADEX ABDOMEN 1 VIEW: CPT | Mod: 26

## 2023-02-18 PROCEDURE — 71046 X-RAY EXAM CHEST 2 VIEWS: CPT | Mod: 26

## 2023-02-18 PROCEDURE — 71045 X-RAY EXAM CHEST 1 VIEW: CPT

## 2023-02-18 PROCEDURE — 36415 COLL VENOUS BLD VENIPUNCTURE: CPT

## 2023-02-18 PROCEDURE — 70450 CT HEAD/BRAIN W/O DYE: CPT | Mod: 26,MA

## 2023-02-18 PROCEDURE — 74018 RADEX ABDOMEN 1 VIEW: CPT

## 2023-02-18 PROCEDURE — 71046 X-RAY EXAM CHEST 2 VIEWS: CPT

## 2023-02-18 PROCEDURE — 85025 COMPLETE CBC W/AUTO DIFF WBC: CPT

## 2023-02-18 PROCEDURE — 70450 CT HEAD/BRAIN W/O DYE: CPT | Mod: MA

## 2023-02-18 PROCEDURE — 71045 X-RAY EXAM CHEST 1 VIEW: CPT | Mod: 26,59

## 2023-02-18 PROCEDURE — 80053 COMPREHEN METABOLIC PANEL: CPT

## 2023-02-18 PROCEDURE — 70250 X-RAY EXAM OF SKULL: CPT

## 2023-02-18 PROCEDURE — 70250 X-RAY EXAM OF SKULL: CPT | Mod: 26

## 2023-02-18 RX ORDER — ACETAMINOPHEN 500 MG
650 TABLET ORAL ONCE
Refills: 0 | Status: COMPLETED | OUTPATIENT
Start: 2023-02-18 | End: 2023-02-18

## 2023-02-18 RX ORDER — DIPHENOXYLATE HCL/ATROPINE 2.5-.025MG
1 TABLET ORAL ONCE
Refills: 0 | Status: DISCONTINUED | OUTPATIENT
Start: 2023-02-18 | End: 2023-02-18

## 2023-02-18 RX ADMIN — Medication 650 MILLIGRAM(S): at 14:43

## 2023-02-18 RX ADMIN — Medication 650 MILLIGRAM(S): at 13:43

## 2023-02-18 RX ADMIN — Medication 1 TABLET(S): at 13:53

## 2023-02-18 NOTE — ED ADULT NURSE NOTE - NSIMPLEMENTINTERV_GEN_ALL_ED
Implemented All Universal Safety Interventions:  [place] to call system. Call bell, personal items and telephone within reach. Instruct patient to call for assistance. Room bathroom lighting operational. Non-slip footwear when patient is off stretcher. Physically safe environment: no spills, clutter or unnecessary equipment. Stretcher in lowest position, wheels locked, appropriate side rails in place.

## 2023-02-18 NOTE — ED ADULT TRIAGE NOTE - CHIEF COMPLAINT QUOTE
reports headache, diarrhea, abdominal pains x since yesterday in am. family member at home was diagnosed with adenovirus.

## 2023-02-18 NOTE — ED ADULT NURSE NOTE - TEMPLATE
Pt presents with irregular periods for approx 6 weeks, pt notes improvement after BC started but had clots and intermittent vaginal bleeding.  Pt unable to get into gyn.  Denies lightheaded or dizziness.  Pt reports some cramping  
Neuro

## 2023-02-18 NOTE — ED PROVIDER NOTE - NSFOLLOWUPINSTRUCTIONS_ED_ALL_ED_FT
COVID-19      COVID-19, or coronavirus disease 2019, is a systemic infection that is caused by a novel coronavirus called SARS-CoV-2. In some people, the virus may not cause any symptoms. In others, it may cause mild or severe symptoms. Some people with severe infection develop severe disease, which may lead to acute respiratory distress syndrome and shock.      What are the causes?  The human body, showing how the coronavirus travels from the air to a person's lungs.   This illness is caused by a virus. The virus may be in the air or on surfaces as droplets or aerosols of various sizes. You may catch the virus by:  •Breathing in droplets from an infected person. Droplets can be spread by a person breathing, speaking, singing, coughing, or sneezing.      •Touching something, like a table or a doorknob, that was exposed to the virus (is contaminated) and then touching your mouth, nose, or eyes.        What increases the risk?    Risk for infection:     You are more likely to become infected with the COVID-19 virus if:  •You are within 6 ft (1.8 m) of a person with COVID-19 for 15 minutes or longer.      •You are providing care for a person who is infected with COVID-19.      •You are in close personal contact with other people. Close personal contact includes hugging, kissing, or sharing eating or drinking utensils.      Risk for serious illness due to COVID-19:    You are more likely to become seriously ill from the COVID-19 virus if:  •You have cancer.    •You have a long-term (chronic) disease, such as:  •Chronic lung disease, including pulmonary embolism, chronic obstructive pulmonary disease, and cystic fibrosis.      •Long-term disease that lowers your body's ability to fight infection (immunocompromise).      •Serious cardiac conditions, such as heart failure, coronary artery disease, or cardiomyopathy.      •Diabetes.      •Chronic kidney disease.      •Liver diseases, including cirrhosis, nonalcoholic fatty liver disease, alcoholic liver disease, or autoimmune hepatitis.        •You are obese.      •You are pregnant or recently pregnant.      •You have sickle cell disease.        What are the signs or symptoms?    Symptoms of this condition can range from mild to severe. Symptoms may appear any time from 2 to 14 days after being exposed to the virus. They include:  •Fever or chills.      •Difficulty breathing or having shortness of breath.      •Feeling tired or very tired.      •Headaches, body aches, or muscle aches.      •Runny or stuffy nose, sneezing, cough, sore throat.      •New loss of taste or smell. This is rare.      Some people may also have stomach problems, such as nausea, vomiting, or diarrhea.    Other people may not have any symptoms of COVID-19.      How is this diagnosed?  A sample being collected by swabbing the nose.   This condition may be diagnosed by testing samples to check for the COVID-19 virus. The most common tests are the PCR test and the antigen test. Tests may be done in the lab or at home. They include:  •Using a swab to take a sample of fluid from the back of your nose and throat (nasopharyngeal fluid), from your nose, or from your throat.      •Testing a sample of saliva from your mouth.      •Testing a sample of coughed-up mucus from your lungs (sputum).        How is this treated?    Treatment for COVID-19 infection depends on the severity of the condition.  •Mild symptoms can be managed at home with rest, fluids, and over-the-counter medicines.    •Serious symptoms may be treated in a hospital intensive care unit, or ICU. Treatment in the ICU may include:  •Supplemental oxygen. Extra oxygen is given through a tube in the nose, a face mask, or a sanches.    •Medicines. You may be given medicines:  •To help your body fight off certain viruses that can cause disease (antivirals).      •To reduce inflammation and suppress the immune system (corticosteroids).      •To prevent or treat blood clots, if they develop (antithrombotics).        •Antibodies made in a lab that can restore or strengthen your body's natural immune system (monoclonal antibody).      •Positioning you to lie on your stomach (prone position). This makes it easier for oxygen to get into the lungs.      •Infection control measures.        If you are at risk for more serious illness due to COVID-19, your health care provider may prescribe a combination of two long-acting monoclonal antibodies, administered together every 6 months.      How is this prevented?    To protect yourself:   •Get vaccinated. COVID-19 vaccines are available for everyone aged 6 months and older.  •Vaccination is recommended for women who are pregnant, may become pregnant, or are breastfeeding.      •Patients who require major surgery should plan their vaccination for several days before or after the surgery.      •Talk to your health care provider about receiving experimental monoclonal antibodies. This treatment has been approved under emergency use authorization to prevent severe illness before or after you are exposed to the COVID-19 virus. You may be given monoclonal antibodies if:  •You are moderately or severely immunocompromised. This includes treatments that lower your immune response. People with immunocompromise may not develop protection against COVID-19 when they are vaccinated.      •You cannot be vaccinated. You may not receive a vaccine if you have a severe allergic reaction to the vaccine or its components.      •You are not fully vaccinated.      •You are in close contact with a person who is infected with SARS-CoV-2, or at high risk of exposure to SARS-CoV-2, in an institution in which COVID-19 is occurring.      •You are at risk of illness from new variants of the COVID-19 virus.        To protect others:     If you have symptoms of COVID-19, take steps to prevent the virus from spreading to others.  •Stay home. Leave your house only to seek medical care. Do not use public transport, if possible.      •Do not travel while you are sick.      •Wash your hands often with soap and water for at least 20 seconds. If soap and water are not available, use alcohol-based hand .      •Stay away from other members of your household. Let healthy household members care for children and pets, if possible. If you have to care for children or pets, wash your hands often and wear a mask. If possible, stay in your own room, separate from others. Use a different bathroom.      •Make sure that all people in your household wash their hands well and often.      •Cough or sneeze into a tissue or your sleeve or elbow. Do not cough or sneeze into your hand or into the air.        Where to find more information    •Centers for Disease Control and Prevention: www.cdc.gov/coronavirus      •World Health Organization: www.who.int/health-topics/coronavirus        Get help right away if:    •You have trouble breathing.      •You have pain or pressure in your chest.      •You have confusion.      •You have bluish lips and fingernails.      •You have difficulty waking from sleep.      •You have symptoms that get worse.      These symptoms may be an emergency. Get help right away. Call 911.   • Do not wait to see if the symptoms will go away.        •  Do not drive yourself to the hospital.         Summary    •COVID-19 is a respiratory infection that is caused by a virus.      •Some people with a severe COVID-19 infection develop severe disease, which may lead to acute respiratory distress syndrome and shock.      •The virus that causes COVID-19 is contagious. This means that it can spread from person to person through droplets from breathing, speaking, singing, coughing, or sneezing.      •Mild symptoms of COVID-19 can be managed at home with rest, fluids, and over-the-counter medicines.
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
## 2023-02-18 NOTE — ED PROVIDER NOTE - PATIENT PORTAL LINK FT
You can access the FollowMyHealth Patient Portal offered by [facility] by registering at the following website: [email or web address]. By joining [employer]’s FollowMyHealth portal, you will also be able to view your health information using other applications (apps) compatible with our system.

## 2023-02-18 NOTE — ED ADULT NURSE NOTE - OBJECTIVE STATEMENT
Assumed care at 1400 pt co headache, congestion, diarrhea since yesterday, pt has hx of stroke 10 years ago and had shunt placement, last year pt had shunt closed

## 2023-02-18 NOTE — ED PROVIDER NOTE - PROGRESS NOTE DETAILS
reviewed lab work ct head, pt noted to be covid positive   supportive care and hydration. ibuprofen and tylenol

## 2023-02-18 NOTE — ED PROVIDER NOTE - ATTENDING APP SHARED VISIT CONTRIBUTION OF CARE
I, Mayra Cohen, performed the initial face to face bedside interview with this patient regarding history of present illness, review of symptoms and relevant past medical, social and family history.  I completed an independent physical examination.  I was the initial provider who evaluated this patient. I have signed out the follow up of any pending tests (i.e. labs, radiological studies) to the ACP.  I have communicated the patient’s plan of care and disposition with the ACP.  The history, relevant review of systems, past medical and surgical history, medical decision making, and physical examination was documented by the scribe in my presence and I attest to the accuracy of the documentation.

## 2023-02-18 NOTE — ED PROVIDER NOTE - CLINICAL SUMMARY MEDICAL DECISION MAKING FREE TEXT BOX
56 y/o male with PMHx of Seizure, TBI with shunt, short term memory loss presents with 1-2 days of fever, congestion, diarrhea. Grandson has adenovirus diagnosed Thursday. Will check labs, chest x-ray, RVP swab, treat symptomatically, and re-assess. 58 y/o male with PMHx of Seizure, TBI with shunt, short term memory loss presents with 1-2 days of fever, congestion, diarrhea. Grandson has adenovirus diagnosed Thursday. Will check labs, chest x-ray, RVP swab, treat symptomatically, and re-assess. reviewed ct head lab work pt covid positive  covid quarantine explained to patient and precautions

## 2023-03-08 DIAGNOSIS — H92.02 DISCOMFORT OF LEFT EAR: Primary | ICD-10-CM

## 2023-03-08 RX ORDER — SULFAMETHOXAZOLE AND TRIMETHOPRIM 800; 160 MG/1; MG/1
1 TABLET ORAL 2 TIMES DAILY
Qty: 20 TABLET | Refills: 0 | Status: SHIPPED | OUTPATIENT
Start: 2023-03-08 | End: 2023-03-18

## 2023-03-22 ENCOUNTER — TELEPHONE (OUTPATIENT)
Dept: FAMILY MEDICINE CLINIC | Facility: CLINIC | Age: 58
End: 2023-03-22

## 2023-03-22 NOTE — TELEPHONE ENCOUNTER
Caller: Raheel Mosqueda    Relationship: Self    Best call back number: [phone]     What medications are you currently taking:   Current Outpatient Medications on File Prior to Visit   Medication Sig Dispense Refill   • amLODIPine (NORVASC) 10 MG tablet TAKE 1 TABLET BY MOUTH DAILY 90 tablet 0   • aspirin 81 MG EC tablet Take 81 mg by mouth daily.     • atorvastatin (LIPITOR) 10 MG tablet Take 1 tablet by mouth Daily. 90 tablet 1   • benazepril (LOTENSIN) 20 MG tablet TAKE 1 TABLET BY MOUTH DAILY 90 tablet 0   • clotrimazole (LOTRIMIN) 1 % cream Apply 1 application topically to the appropriate area as directed 2 (Two) Times a Day. 30 g 1   • escitalopram (LEXAPRO) 10 MG tablet Take 2 tablets by mouth Daily. OR PER WEANING SCHEDULE 180 tablet 1   • hydrocortisone 1 % cream Apply 1 application topically to the appropriate area as directed 2 (Two) Times a Day. 30 g 1   • MULTIPLE VITAMIN PO Take 1 tablet by mouth Daily.       No current facility-administered medications on file prior to visit.          CONCERNS:    [id number] - [place], [place] - [address] [facility] AT [facility] - [phone] [facility] [phone] FX  P:        PATIENT RECEIVED A TEXT STATING THAT YOU WHERE GOING TO SEND HIM SOME MORE ANTIBIOTICS FOR HIS ONGOING EAR INFECTIONS.    AS OF TODAY, THE PHARMACY HAS NOT GOTTEN THE SCRIPT.  CAN YOU PLEASE SEND IT TO THE [facility]'S PHARMACY.

## 2023-03-23 DIAGNOSIS — H92.02 DISCOMFORT OF LEFT EAR: Primary | ICD-10-CM

## 2023-03-23 RX ORDER — SULFAMETHOXAZOLE AND TRIMETHOPRIM 800; 160 MG/1; MG/1
1 TABLET ORAL 2 TIMES DAILY
Qty: 20 TABLET | Refills: 0 | Status: SHIPPED | OUTPATIENT
Start: 2023-03-23 | End: 2023-04-02

## 2023-03-23 NOTE — TELEPHONE ENCOUNTER
Caller: Raheel Mosqueda    Relationship: Self    Best call back number: [phone]    What was the call regarding: PATIENT WAS CALLING TO CHECK ON THIS REQUEST, HIS EAR IS STILL IN PAIN AND IS HOPING HE CAN  MEDICATION TODAY. PLEASE CALL BACK AS SOON AS POSSIBLE TO GIVE HIM A STATUS UPDATE.     Do you require a callback: YES

## 2023-04-12 DIAGNOSIS — I10 ESSENTIAL HYPERTENSION: ICD-10-CM

## 2023-04-12 RX ORDER — BENAZEPRIL HYDROCHLORIDE 20 MG/1
20 TABLET ORAL DAILY
Qty: 90 TABLET | Refills: 1 | Status: SHIPPED | OUTPATIENT
Start: 2023-04-12

## 2023-04-12 RX ORDER — AMLODIPINE BESYLATE 10 MG/1
10 TABLET ORAL DAILY
Qty: 90 TABLET | Refills: 1 | Status: SHIPPED | OUTPATIENT
Start: 2023-04-12

## 2023-04-12 NOTE — TELEPHONE ENCOUNTER
Rx Refill Note  Requested Prescriptions     Pending Prescriptions Disp Refills   • amLODIPine (NORVASC) 10 MG tablet [Pharmacy Med Name: AMLODIPINE BESYLATE 10MG TABLETS] 90 tablet 0     Sig: TAKE 1 TABLET BY MOUTH DAILY   • benazepril (LOTENSIN) 20 MG tablet [Pharmacy Med Name: BENAZEPRIL 20MG TABLETS] 90 tablet 0     Sig: TAKE 1 TABLET BY MOUTH DAILY      Last office visit with prescribing clinician: Visit date not found   Last telemedicine visit with prescribing clinician: 8/24/2023   Next office visit with prescribing clinician: Visit date not found                         Would you like a call back once the refill request has been completed: [] Yes [] No    If the office needs to give you a call back, can they leave a voicemail: [] Yes [] No    Donald Meyer Rep  04/12/23, 08:54 EDT

## 2023-07-13 ENCOUNTER — NON-APPOINTMENT (OUTPATIENT)
Age: 58
End: 2023-07-13

## 2023-08-17 ENCOUNTER — TELEPHONE (OUTPATIENT)
Dept: FAMILY MEDICINE CLINIC | Facility: CLINIC | Age: 58
End: 2023-08-17
Payer: COMMERCIAL

## 2023-08-17 DIAGNOSIS — E78.00 PURE HYPERCHOLESTEROLEMIA: ICD-10-CM

## 2023-08-17 DIAGNOSIS — I10 ESSENTIAL HYPERTENSION: Primary | ICD-10-CM

## 2023-08-17 DIAGNOSIS — Z12.5 SCREENING PSA (PROSTATE SPECIFIC ANTIGEN): ICD-10-CM

## 2023-08-17 DIAGNOSIS — Z00.00 HEALTH CARE MAINTENANCE: ICD-10-CM

## 2023-08-17 NOTE — TELEPHONE ENCOUNTER
Caller: Raheel Mosqueda    Relationship: Self    Best call back number: [phone]     What orders are you requesting (i.e. lab or imaging): LABS    In what timeframe would the patient need to come in: IN TIME FOR PHYSICAL ON 8/24/2023    Where will you receive your lab/imaging services: IN-OFFICE

## 2023-08-21 DIAGNOSIS — E78.00 PURE HYPERCHOLESTEROLEMIA: ICD-10-CM

## 2023-08-21 DIAGNOSIS — Z00.00 HEALTH CARE MAINTENANCE: ICD-10-CM

## 2023-08-21 DIAGNOSIS — I10 ESSENTIAL HYPERTENSION: ICD-10-CM

## 2023-08-22 ENCOUNTER — LAB (OUTPATIENT)
Dept: LAB | Facility: HOSPITAL | Age: 58
End: 2023-08-22
Payer: COMMERCIAL

## 2023-08-22 LAB
ALBUMIN SERPL-MCNC: 4.6 G/DL (ref 3.5–5.2)
ALBUMIN/GLOB SERPL: 1.5 G/DL
ALP SERPL-CCNC: 78 U/L (ref 39–117)
ALT SERPL W P-5'-P-CCNC: 21 U/L (ref 1–41)
ANION GAP SERPL CALCULATED.3IONS-SCNC: 11 MMOL/L (ref 5–15)
AST SERPL-CCNC: 28 U/L (ref 1–40)
BILIRUB SERPL-MCNC: 0.7 MG/DL (ref 0–1.2)
BUN SERPL-MCNC: 20 MG/DL (ref 6–20)
BUN/CREAT SERPL: 18.5 (ref 7–25)
CALCIUM SPEC-SCNC: 9.5 MG/DL (ref 8.6–10.5)
CHLORIDE SERPL-SCNC: 102 MMOL/L (ref 98–107)
CHOLEST SERPL-MCNC: 146 MG/DL (ref 0–200)
CO2 SERPL-SCNC: 27 MMOL/L (ref 22–29)
CREAT SERPL-MCNC: 1.08 MG/DL (ref 0.76–1.27)
EGFRCR SERPLBLD CKD-EPI 2021: 79.5 ML/MIN/1.73
GLOBULIN UR ELPH-MCNC: 3 GM/DL
GLUCOSE SERPL-MCNC: 95 MG/DL (ref 65–99)
HDLC SERPL-MCNC: 49 MG/DL (ref 40–60)
LDLC SERPL CALC-MCNC: 84 MG/DL (ref 0–100)
LDLC/HDLC SERPL: 1.71 {RATIO}
POTASSIUM SERPL-SCNC: 4.8 MMOL/L (ref 3.5–5.2)
PROT SERPL-MCNC: 7.6 G/DL (ref 6–8.5)
SODIUM SERPL-SCNC: 140 MMOL/L (ref 136–145)
TRIGL SERPL-MCNC: 66 MG/DL (ref 0–150)
VLDLC SERPL-MCNC: 13 MG/DL (ref 5–40)

## 2023-08-22 PROCEDURE — 80061 LIPID PANEL: CPT | Performed by: FAMILY MEDICINE

## 2023-08-22 PROCEDURE — 85025 COMPLETE CBC W/AUTO DIFF WBC: CPT | Performed by: FAMILY MEDICINE

## 2023-08-22 PROCEDURE — 80053 COMPREHEN METABOLIC PANEL: CPT | Performed by: FAMILY MEDICINE

## 2023-08-22 PROCEDURE — G0103 PSA SCREENING: HCPCS | Performed by: FAMILY MEDICINE

## 2023-08-24 ENCOUNTER — OFFICE VISIT (OUTPATIENT)
Dept: FAMILY MEDICINE CLINIC | Facility: CLINIC | Age: 58
End: 2023-08-24
Payer: COMMERCIAL

## 2023-08-24 VITALS
WEIGHT: 218.9 LBS | HEIGHT: 71 IN | SYSTOLIC BLOOD PRESSURE: 111 MMHG | BODY MASS INDEX: 30.65 KG/M2 | OXYGEN SATURATION: 96 % | DIASTOLIC BLOOD PRESSURE: 71 MMHG | TEMPERATURE: 98 F | HEART RATE: 66 BPM

## 2023-08-24 DIAGNOSIS — Z12.5 SCREENING PSA (PROSTATE SPECIFIC ANTIGEN): ICD-10-CM

## 2023-08-24 DIAGNOSIS — I10 ESSENTIAL HYPERTENSION: Chronic | ICD-10-CM

## 2023-08-24 DIAGNOSIS — E78.00 PURE HYPERCHOLESTEROLEMIA: Chronic | ICD-10-CM

## 2023-08-24 DIAGNOSIS — Z00.00 HEALTH CARE MAINTENANCE: Primary | ICD-10-CM

## 2023-08-24 DIAGNOSIS — F41.9 ANXIETY: Chronic | ICD-10-CM

## 2023-08-24 NOTE — PROGRESS NOTES
"Chief Complaint  Annual Exam    Subjective        Raheel Mosqueda presents to [facility]  History of Present Illness    Annual healthcare maintenance visit.  Non-smoker.  No heavy alcohol use.  He exercises almost daily.  He lifts weights couple times a week.  He walks nearly daily.  For about an hour.  He was having some sexual side effects from the higher dose of escitalopram.  We lowered it down to 10 mg daily and that improved.  However he has had some slight increase in anxiety but he is coping with it.  He is happy overall with the medication dosage.  He continues on amlodipine and benazepril for hypertension.  Blood pressure today looks great.  No cardiovascular symptoms.  No exercise intolerance.  He continues atorvastatin 10 mg daily.  Lipid panel favorable.  Liver enzymes normal.  Reviewed his lab work otherwise.  Creatinine normal.    Social History     Tobacco Use    Smoking status: Never    Smokeless tobacco: Never   Substance Use Topics    Alcohol use: No    Drug use: No           Objective   Vital Signs:  /71   Pulse 66   Temp 98 øF (36.7 øC) (Temporal)   Ht 180.3 cm (70.98\")   Wt 99.3 kg (218 lb 14.4 oz)   SpO2 96%   BMI 30.55 kg/mý   Estimated body mass index is 30.55 kg/mý as calculated from the following:    Height as of this encounter: 180.3 cm (70.98\").    Weight as of this encounter: 99.3 kg (218 lb 14.4 oz).               Physical Exam  Constitutional:       Appearance: Normal appearance.   HENT:      Head: Atraumatic.      Mouth/Throat:      Pharynx: Oropharynx is clear. No oropharyngeal exudate or posterior oropharyngeal erythema.   Eyes:      Conjunctiva/sclera: Conjunctivae normal.   Neck:      Thyroid: No thyroid mass, thyromegaly or thyroid tenderness.   Cardiovascular:      Rate and Rhythm: Normal rate and regular rhythm.      Pulses: Normal pulses.      Heart sounds: Normal heart sounds.   Pulmonary:      Effort: Pulmonary effort is normal.    "   Breath sounds: Normal breath sounds.   Abdominal:      General: Abdomen is flat. There is no distension.      Palpations: Abdomen is soft. There is no mass.      Tenderness: There is no abdominal tenderness.      Hernia: No hernia is present.   Musculoskeletal:         General: Normal range of motion.      Cervical back: Normal range of motion and neck supple. No muscular tenderness.   Lymphadenopathy:      Cervical: No cervical adenopathy.   Skin:     General: Skin is warm and dry.      Findings: No rash.   Neurological:      General: No focal deficit present.      Mental Status: He is alert and oriented to person, place, and time.   Psychiatric:         Mood and Affect: Mood normal.      Result Review :  The following data was reviewed by: Carl Mckinley MD on 08/24/2023:  Common labs          8/22/2023    07:56   Common Labs   Glucose 95    BUN 20    Creatinine 1.08    Sodium 140    Potassium 4.8    Chloride 102    Calcium 9.5    Albumin 4.6    Total Bilirubin 0.7    Alkaline Phosphatase 78    AST (SGOT) 28    ALT (SGPT) 21    WBC 6.60    Hemoglobin 13.5    Hematocrit 40.4    Platelets 219    Total Cholesterol 146    Triglycerides 66    HDL Cholesterol 49    LDL Cholesterol  84    PSA 0.471                   Assessment and Plan   Diagnoses and all orders for this visit:    1. Health care maintenance (Primary)  -     Comprehensive Metabolic Panel; Future  -     CBC & Differential; Future  -     Lipid Panel; Future  -     PSA Screen; Future    2. Essential hypertension  -     Comprehensive Metabolic Panel; Future  -     CBC & Differential; Future  -     Lipid Panel; Future    3. Pure hypercholesterolemia  -     Comprehensive Metabolic Panel; Future  -     CBC & Differential; Future  -     Lipid Panel; Future    4. Anxiety  -     Comprehensive Metabolic Panel; Future  -     CBC & Differential; Future    5. Screening PSA (prostate specific antigen)  -     PSA Screen; Future    Other orders  -     Tdap Vaccine  Greater Than or Equal To 6yo IM      Annual healthcare maintenance visit.    Immunizations.  Tdap today.  Up-to-date COVID booster recommended this autumn along with a flu vaccine.    Hypertension.  Very well controlled.  Continue amlodipine and benazepril.  Normal potassium sodium and creatinine.  No exercise intolerance.    Hyperlipidemia.  Continue atorvastatin.    Anxiety.  He continues maintenance Lexapro 10 mg daily.  He is not having the sexual dysfunction now and the anxiety symptoms are manageable.    Prostate cancer screening.  PSA remains very low.  No  symptoms.    Colon cancer screening.  Up-to-date.    Other preventative health practices discussed including regular exercise.  See me in 1 year.  Sooner as needed.           Follow Up   No follow-ups on file.  Patient was given instructions and counseling regarding his condition or for health maintenance advice. Please see specific information pulled into the AVS if appropriate.          CAD

## 2023-09-13 NOTE — ED ADULT NURSE NOTE - CCCP TRG CHIEF CMPLNT
Clinic Visit Note:     Pt seen in clinic today: Yes  Pt brought back up equipment: Yes  Primary Controller SN: [id number]; 19 months  Secondary Controller SN: [id number]; 18 months  Controller Settings: 5700/5500; 39%     flu-like symptoms

## 2023-09-16 ENCOUNTER — NON-APPOINTMENT (OUTPATIENT)
Age: 58
End: 2023-09-16

## 2023-09-17 ENCOUNTER — RESULT REVIEW (OUTPATIENT)
Age: 58
End: 2023-09-17

## 2023-09-25 NOTE — ED ADULT TRIAGE NOTE - DOMESTIC TRAVEL HIGH RISK QUESTION
CLINICAL PHARMACY NOTE: MEDS TO BEDS    Total # of Prescriptions Filled: 2   The following medications were delivered to the patient:  Oxycodone/Acetaminophen 5/325  Xarelto Starter    Additional Documentation:
QTc interval reviewed  Philip Yanez  9/25/2023  7:45 AM
No

## 2023-10-20 DIAGNOSIS — I10 ESSENTIAL HYPERTENSION: ICD-10-CM

## 2023-10-20 RX ORDER — AMLODIPINE BESYLATE 10 MG/1
10 TABLET ORAL DAILY
Qty: 90 TABLET | Refills: 1 | Status: SHIPPED | OUTPATIENT
Start: 2023-10-20

## 2023-10-20 RX ORDER — BENAZEPRIL HYDROCHLORIDE 20 MG/1
20 TABLET ORAL DAILY
Qty: 90 TABLET | Refills: 1 | Status: SHIPPED | OUTPATIENT
Start: 2023-10-20

## 2023-10-20 NOTE — TELEPHONE ENCOUNTER
Rx Refill Note  Requested Prescriptions     Pending Prescriptions Disp Refills    amLODIPine (NORVASC) 10 MG tablet 90 tablet 1     Sig: Take 1 tablet by mouth Daily.    benazepril (LOTENSIN) 20 MG tablet 90 tablet 1     Sig: Take 1 tablet by mouth Daily.      Last office visit with prescribing clinician: 8/24/2023   Last telemedicine visit with prescribing clinician: Visit date not found   Next office visit with prescribing clinician: 8/28/2024                         Would you like a call back once the refill request has been completed: [] Yes [] No    If the office needs to give you a call back, can they leave a voicemail: [] Yes [] No    Mireille Stephens  10/20/23, 15:50 EDT

## 2024-01-09 RX ORDER — ATORVASTATIN CALCIUM 10 MG/1
10 TABLET, FILM COATED ORAL DAILY
Qty: 90 TABLET | Refills: 1 | Status: SHIPPED | OUTPATIENT
Start: 2024-01-09

## 2024-01-09 NOTE — TELEPHONE ENCOUNTER
Rx Refill Note  Requested Prescriptions     Pending Prescriptions Disp Refills    atorvastatin (LIPITOR) 10 MG tablet [Pharmacy Med Name: ATORVASTATIN 10MG TABLETS] 90 tablet 1     Sig: TAKE 1 TABLET BY MOUTH DAILY      Last office visit with prescribing clinician: 8/24/2023   Last telemedicine visit with prescribing clinician: Visit date not found   Next office visit with prescribing clinician: 1/9/2024                         Would you like a call back once the refill request has been completed: [] Yes [] No    If the office needs to give you a call back, can they leave a voicemail: [] Yes [] No    Mireille Stephens  01/09/24, 11:10 EST

## 2024-04-16 DIAGNOSIS — I10 ESSENTIAL HYPERTENSION: ICD-10-CM

## 2024-04-16 RX ORDER — BENAZEPRIL HYDROCHLORIDE 20 MG/1
20 TABLET ORAL DAILY
Qty: 90 TABLET | Refills: 1 | Status: SHIPPED | OUTPATIENT
Start: 2024-04-16

## 2024-04-16 RX ORDER — AMLODIPINE BESYLATE 10 MG/1
10 TABLET ORAL DAILY
Qty: 90 TABLET | Refills: 1 | Status: SHIPPED | OUTPATIENT
Start: 2024-04-16

## 2024-04-16 NOTE — TELEPHONE ENCOUNTER
Rx Refill Note  Requested Prescriptions     Pending Prescriptions Disp Refills    benazepril (LOTENSIN) 20 MG tablet [Pharmacy Med Name: BENAZEPRIL 20MG TABLETS] 90 tablet 1     Sig: TAKE 1 TABLET BY MOUTH DAILY    amLODIPine (NORVASC) 10 MG tablet [Pharmacy Med Name: AMLODIPINE BESYLATE 10MG TABLETS] 90 tablet 1     Sig: TAKE 1 TABLET BY MOUTH DAILY      Last office visit with prescribing clinician: 8/24/2023   Last telemedicine visit with prescribing clinician: Visit date not found   Next office visit with prescribing clinician: 8/28/2024                         Would you like a call back once the refill request has been completed: [] Yes [] No    If the office needs to give you a call back, can they leave a voicemail: [] Yes [] No    Mireille Stephens  04/16/24, 08:46 EDT

## 2024-04-18 DIAGNOSIS — I10 ESSENTIAL HYPERTENSION: ICD-10-CM

## 2024-04-18 RX ORDER — AMLODIPINE BESYLATE 10 MG/1
10 TABLET ORAL DAILY
Qty: 90 TABLET | Refills: 1 | Status: CANCELLED | OUTPATIENT
Start: 2024-04-18

## 2024-04-18 RX ORDER — BENAZEPRIL HYDROCHLORIDE 20 MG/1
20 TABLET ORAL DAILY
Qty: 90 TABLET | Refills: 1 | Status: CANCELLED | OUTPATIENT
Start: 2024-04-18

## 2024-04-18 RX ORDER — ESCITALOPRAM OXALATE 10 MG/1
20 TABLET ORAL DAILY
Qty: 180 TABLET | Refills: 1 | Status: SHIPPED | OUTPATIENT
Start: 2024-04-18

## 2024-04-18 RX ORDER — ATORVASTATIN CALCIUM 10 MG/1
10 TABLET, FILM COATED ORAL DAILY
Qty: 90 TABLET | Refills: 1 | Status: CANCELLED | OUTPATIENT
Start: 2024-04-18

## 2024-04-18 NOTE — TELEPHONE ENCOUNTER
Rx Refill Note  Requested Prescriptions     Pending Prescriptions Disp Refills    escitalopram (LEXAPRO) 10 MG tablet 180 tablet 1     Sig: Take 2 tablets by mouth Daily. OR PER WEANING SCHEDULE      Last office visit with prescribing clinician: 8/24/2023   Last telemedicine visit with prescribing clinician: Visit date not found   Next office visit with prescribing clinician: 8/28/2024                         Would you like a call back once the refill request has been completed: [] Yes [] No    If the office needs to give you a call back, can they leave a voicemail: [] Yes [] No    Mireille Stephens  04/18/24, 10:33 EDT

## 2024-06-27 RX ORDER — ATORVASTATIN CALCIUM 10 MG/1
10 TABLET, FILM COATED ORAL DAILY
Qty: 90 TABLET | Refills: 1 | Status: SHIPPED | OUTPATIENT
Start: 2024-06-27

## 2024-08-28 ENCOUNTER — OFFICE VISIT (OUTPATIENT)
Dept: FAMILY MEDICINE CLINIC | Facility: CLINIC | Age: 59
End: 2024-08-28
Payer: COMMERCIAL

## 2024-08-28 VITALS
SYSTOLIC BLOOD PRESSURE: 132 MMHG | BODY MASS INDEX: 31.98 KG/M2 | WEIGHT: 228.4 LBS | HEIGHT: 71 IN | OXYGEN SATURATION: 96 % | DIASTOLIC BLOOD PRESSURE: 82 MMHG | TEMPERATURE: 97.8 F | HEART RATE: 77 BPM

## 2024-08-28 DIAGNOSIS — F41.9 ANXIETY: Chronic | ICD-10-CM

## 2024-08-28 DIAGNOSIS — E78.00 PURE HYPERCHOLESTEROLEMIA: Chronic | ICD-10-CM

## 2024-08-28 DIAGNOSIS — Z00.00 HEALTH CARE MAINTENANCE: Primary | ICD-10-CM

## 2024-08-28 DIAGNOSIS — I10 ESSENTIAL HYPERTENSION: ICD-10-CM

## 2024-08-28 PROCEDURE — 99214 OFFICE O/P EST MOD 30 MIN: CPT | Performed by: FAMILY MEDICINE

## 2024-08-28 PROCEDURE — 99396 PREV VISIT EST AGE 40-64: CPT | Performed by: FAMILY MEDICINE

## 2024-08-28 RX ORDER — ESCITALOPRAM OXALATE 10 MG/1
10 TABLET ORAL DAILY
Start: 2024-08-28

## 2024-08-28 NOTE — PROGRESS NOTES
"Chief Complaint  Annual Exam and Rash (Non itching rash on chest x 2 months )    Subjective        Raheel Mosqueda presents to [facility]  History of Present Illness    Annual healthcare maintenance visit.  Non-smoker.  He exercises very often.  Lifts weights a few times a week plus does a lot of walking.  He was having some trouble with decreased libido but no other hypogonadal symptoms.  Went to a retail hormone clinic.  He states his testosterone levels in the 300s.  May not have been a.m. fasting.  He has been taking testosterone pellets injected since.  He states his libido feels better, and he overall feels better.  He has gained some weight through lifting weights.  He does not have a known history of coronary artery disease, but he does take atorvastatin and aspirin.  His lipid panel is overall favorable.  His blood pressures been fairly well-controlled minimally elevated today.  He continues on amlodipine and benazepril.  His potassium sodium and creatinine is normal.  He continues on Lexapro 10 mg a day with history of anxiety.  We lowered the dose from 20 mg and number months ago.  He states he is doing well with that.  His parents have been ill but they are getting better.    Objective   Vital Signs:  /82   Pulse 77   Temp 97.8 °F (36.6 °C) (Temporal)   Ht 180.3 cm (71\")   Wt 104 kg (228 lb 6.4 oz)   SpO2 96%   BMI 31.86 kg/m²   Estimated body mass index is 31.86 kg/m² as calculated from the following:    Height as of this encounter: 180.3 cm (71\").    Weight as of this encounter: 104 kg (228 lb 6.4 oz).          Physical Exam  Constitutional:       Appearance: Normal appearance.   HENT:      Head: Atraumatic.      Mouth/Throat:      Pharynx: Oropharynx is clear. No oropharyngeal exudate or posterior oropharyngeal erythema.   Eyes:      Conjunctiva/sclera: Conjunctivae normal.   Neck:      Thyroid: No thyroid mass, thyromegaly or thyroid tenderness. "   Cardiovascular:      Rate and Rhythm: Normal rate and regular rhythm.      Pulses: Normal pulses.      Heart sounds: Normal heart sounds.   Pulmonary:      Effort: Pulmonary effort is normal.      Breath sounds: Normal breath sounds.   Abdominal:      General: Abdomen is flat. There is no distension.      Palpations: Abdomen is soft. There is no mass.      Tenderness: There is no abdominal tenderness.      Hernia: No hernia is present.   Genitourinary:     Comments: Prostate slightly enlarged otherwise normal.  Musculoskeletal:         General: Normal range of motion.      Cervical back: Normal range of motion and neck supple. No muscular tenderness.   Lymphadenopathy:      Cervical: No cervical adenopathy.   Skin:     General: Skin is warm and dry.      Findings: Rash present.      Comments: He has a few scattered inflamed hair follicles over the distal sternum.  Probably miliaria.   Neurological:      General: No focal deficit present.      Mental Status: He is alert and oriented to person, place, and time.   Psychiatric:         Mood and Affect: Mood normal.        Result Review :  The following data was reviewed by: Carl Mckinley MD on 08/28/2024:  Common labs          8/21/2024    08:17   Common Labs   Glucose 92    BUN 12    Creatinine 1.12    Sodium 137    Potassium 4.8    Chloride 100    Calcium 9.5    Total Protein 6.9    Albumin 4.3    Total Bilirubin 0.6    Alkaline Phosphatase 67    AST (SGOT) 34    ALT (SGPT) 29    WBC 8.42    Hemoglobin 16.0    Hematocrit 48.5    Platelets 226    Total Cholesterol 125    Triglycerides 48    HDL Cholesterol 41    LDL Cholesterol  73    PSA 0.752                Assessment and Plan   Diagnoses and all orders for this visit:    1. Health care maintenance (Primary)    2. Essential hypertension  -     CBC & Differential  -     Comprehensive Metabolic Panel  -     Lipid Panel    3. Pure hypercholesterolemia  -     CBC & Differential  -     Comprehensive Metabolic  Panel  -     Lipid Panel    4. Anxiety    Other orders  -     escitalopram (LEXAPRO) 10 MG tablet; Take 1 tablet by mouth Daily.    Annual healthcare maintenance visit.    Immunizations reviewed and discussed.    Colon cancer screening up-to-date.    Prostate cancer screening up-to-date.    Hypertension.  Overall well-controlled.  Continue amlodipine and benazepril.  See me in 6 months.    Hyperlipidemia.  Continue low-dose aspirin.  Benefits likely outweigh risks.  Continue atorvastatin.    Testosterone supplementation at a retail clinic in [facility].  I do not have all records.  I am concerned that there may not be an absolute indication for his testosterone use.  We discussed FDA warnings with regards to increased cardiovascular risk.  Patient was counseled.  His hematocrit has jumped since starting testosterone therapy.  Likely causation.  It is not beyond limits, but he may wish to donate blood twice a year.       Follow Up   No follow-ups on file.  Patient was given instructions and counseling regarding his condition or for health maintenance advice. Please see specific information pulled into the AVS if appropriate.

## 2024-10-13 DIAGNOSIS — I10 ESSENTIAL HYPERTENSION: ICD-10-CM

## 2024-10-14 RX ORDER — BENAZEPRIL HYDROCHLORIDE 20 MG/1
20 TABLET ORAL DAILY
Qty: 90 TABLET | Refills: 1 | Status: SHIPPED | OUTPATIENT
Start: 2024-10-14

## 2024-10-14 RX ORDER — AMLODIPINE BESYLATE 10 MG/1
10 TABLET ORAL DAILY
Qty: 90 TABLET | Refills: 1 | Status: SHIPPED | OUTPATIENT
Start: 2024-10-14

## 2024-10-14 NOTE — TELEPHONE ENCOUNTER
Rx Refill Note  Requested Prescriptions     Pending Prescriptions Disp Refills    amLODIPine (NORVASC) 10 MG tablet [Pharmacy Med Name: amLODIPine Besylate Oral Tablet 10 MG] 90 tablet 1     Sig: TAKE 1 TABLET BY MOUTH EVERY DAY    benazepril (LOTENSIN) 20 MG tablet [Pharmacy Med Name: Benazepril HCl Oral Tablet 20 MG] 90 tablet 1     Sig: TAKE 1 TABLET BY MOUTH EVERY DAY      Last office visit with prescribing clinician: 8/28/2024   Last telemedicine visit with prescribing clinician: Visit date not found   Next office visit with prescribing clinician: 3/7/2025                         Would you like a call back once the refill request has been completed: [] Yes [] No    If the office needs to give you a call back, can they leave a voicemail: [] Yes [] No    Mireille Stephens  10/14/24, 10:56 EDT

## 2025-01-07 RX ORDER — ATORVASTATIN CALCIUM 10 MG/1
10 TABLET, FILM COATED ORAL DAILY
Qty: 90 TABLET | Refills: 1 | Status: SHIPPED | OUTPATIENT
Start: 2025-01-07

## 2025-01-07 NOTE — TELEPHONE ENCOUNTER
Rx Refill Note  Requested Prescriptions     Pending Prescriptions Disp Refills    atorvastatin (LIPITOR) 10 MG tablet 90 tablet 1     Sig: Take 1 tablet by mouth Daily.      Last office visit with prescribing clinician: 8/28/2024   Last telemedicine visit with prescribing clinician: Visit date not found   Next office visit with prescribing clinician: 1/6/2025                         Would you like a call back once the refill request has been completed: [] Yes [] No    If the office needs to give you a call back, can they leave a voicemail: [] Yes [] No    Donald Meyer Rep  01/07/25, 10:55 EST

## 2025-02-28 LAB
ALBUMIN SERPL-MCNC: 4.4 G/DL (ref 3.5–5.2)
ALBUMIN/GLOB SERPL: 1.7 G/DL
ALP SERPL-CCNC: 65 U/L (ref 39–117)
ALT SERPL-CCNC: 27 U/L (ref 1–41)
AST SERPL-CCNC: 28 U/L (ref 1–40)
BASOPHILS # BLD AUTO: 0.07 10*3/MM3 (ref 0–0.2)
BASOPHILS NFR BLD AUTO: 1 % (ref 0–1.5)
BILIRUB SERPL-MCNC: 1 MG/DL (ref 0–1.2)
BUN SERPL-MCNC: 15 MG/DL (ref 6–20)
BUN/CREAT SERPL: 13.2 (ref 7–25)
CALCIUM SERPL-MCNC: 9.7 MG/DL (ref 8.6–10.5)
CHLORIDE SERPL-SCNC: 101 MMOL/L (ref 98–107)
CHOLEST SERPL-MCNC: 140 MG/DL (ref 0–200)
CO2 SERPL-SCNC: 25.3 MMOL/L (ref 22–29)
CREAT SERPL-MCNC: 1.14 MG/DL (ref 0.76–1.27)
EGFRCR SERPLBLD CKD-EPI 2021: 74.1 ML/MIN/1.73
EOSINOPHIL # BLD AUTO: 0.18 10*3/MM3 (ref 0–0.4)
EOSINOPHIL NFR BLD AUTO: 2.5 % (ref 0.3–6.2)
ERYTHROCYTE [DISTWIDTH] IN BLOOD BY AUTOMATED COUNT: 12.8 % (ref 12.3–15.4)
GLOBULIN SER CALC-MCNC: 2.6 GM/DL
GLUCOSE SERPL-MCNC: 95 MG/DL (ref 65–99)
HCT VFR BLD AUTO: 46.7 % (ref 37.5–51)
HDLC SERPL-MCNC: 39 MG/DL (ref 40–60)
HGB BLD-MCNC: 15.7 G/DL (ref 13–17.7)
IMM GRANULOCYTES # BLD AUTO: 0.02 10*3/MM3 (ref 0–0.05)
IMM GRANULOCYTES NFR BLD AUTO: 0.3 % (ref 0–0.5)
LDLC SERPL CALC-MCNC: 88 MG/DL (ref 0–100)
LYMPHOCYTES # BLD AUTO: 1.49 10*3/MM3 (ref 0.7–3.1)
LYMPHOCYTES NFR BLD AUTO: 21 % (ref 19.6–45.3)
MCH RBC QN AUTO: 31 PG (ref 26.6–33)
MCHC RBC AUTO-ENTMCNC: 33.6 G/DL (ref 31.5–35.7)
MCV RBC AUTO: 92.3 FL (ref 79–97)
MONOCYTES # BLD AUTO: 0.91 10*3/MM3 (ref 0.1–0.9)
MONOCYTES NFR BLD AUTO: 12.9 % (ref 5–12)
NEUTROPHILS # BLD AUTO: 4.41 10*3/MM3 (ref 1.7–7)
NEUTROPHILS NFR BLD AUTO: 62.3 % (ref 42.7–76)
NRBC BLD AUTO-RTO: 0 /100 WBC (ref 0–0.2)
PLATELET # BLD AUTO: 255 10*3/MM3 (ref 140–450)
POTASSIUM SERPL-SCNC: 4.8 MMOL/L (ref 3.5–5.2)
PROT SERPL-MCNC: 7 G/DL (ref 6–8.5)
RBC # BLD AUTO: 5.06 10*6/MM3 (ref 4.14–5.8)
SODIUM SERPL-SCNC: 136 MMOL/L (ref 136–145)
TRIGL SERPL-MCNC: 61 MG/DL (ref 0–150)
VLDLC SERPL CALC-MCNC: 13 MG/DL (ref 5–40)
WBC # BLD AUTO: 7.08 10*3/MM3 (ref 3.4–10.8)

## 2025-03-14 ENCOUNTER — OFFICE VISIT (OUTPATIENT)
Dept: FAMILY MEDICINE CLINIC | Facility: CLINIC | Age: 60
End: 2025-03-14
Payer: COMMERCIAL

## 2025-03-14 VITALS
HEART RATE: 75 BPM | WEIGHT: 227 LBS | DIASTOLIC BLOOD PRESSURE: 79 MMHG | BODY MASS INDEX: 31.78 KG/M2 | SYSTOLIC BLOOD PRESSURE: 135 MMHG | TEMPERATURE: 97.1 F | HEIGHT: 71 IN | OXYGEN SATURATION: 97 %

## 2025-03-14 DIAGNOSIS — F41.9 ANXIETY: ICD-10-CM

## 2025-03-14 DIAGNOSIS — I10 ESSENTIAL HYPERTENSION: Primary | ICD-10-CM

## 2025-03-14 DIAGNOSIS — E78.00 PURE HYPERCHOLESTEROLEMIA: ICD-10-CM

## 2025-03-14 DIAGNOSIS — Z12.5 SCREENING PSA (PROSTATE SPECIFIC ANTIGEN): ICD-10-CM

## 2025-03-14 PROCEDURE — 99214 OFFICE O/P EST MOD 30 MIN: CPT | Performed by: FAMILY MEDICINE

## 2025-03-14 RX ORDER — ESCITALOPRAM OXALATE 10 MG/1
15 TABLET ORAL DAILY
Qty: 135 TABLET | Refills: 3 | Status: SHIPPED | OUTPATIENT
Start: 2025-03-14

## 2025-03-14 RX ORDER — AMLODIPINE AND BENAZEPRIL HYDROCHLORIDE 10; 20 MG/1; MG/1
1 CAPSULE ORAL DAILY
Qty: 90 CAPSULE | Refills: 3 | Status: SHIPPED | OUTPATIENT
Start: 2025-03-14

## 2025-03-14 NOTE — PROGRESS NOTES
"Chief Complaint  Hyperlipidemia and Anxiety    Subjective        Raheel Mosqueda presents to [facility]  Hypertension  This is a chronic problem. The current episode started more than 1 year ago. The problem has been improved since onset. Associated symptoms include anxiety. Pertinent negatives include no blurred vision, chest pain, headaches, malaise/fatigue, orthopnea, palpitations, peripheral edema or shortness of breath.     Follow-up anxiety.  Longstanding.  Has been on Lexapro for the better part of a decade.  He states in recent months his anxiety level is higher mainly due to work stressors.  No panic attacks.  No major depression symptoms.  I reviewed his PHQ-9 and ELO-7 scores.  In the past he has taken 20 mg of Lexapro.  But that caused sexual dysfunction.  He is interested in going on 1-1/2 daily.    Hyperlipidemia hypertension.  Blood pressure is acceptable today.  He continues on amlodipine and benazepril.  Also atorvastatin.  Recent lab work overall favorable with exception of a slightly low HDL/good cholesterol.    Objective   Vital Signs:  /79   Pulse 75   Temp 97.1 °F (36.2 °C) (Temporal)   Ht 180.3 cm (71\")   Wt 103 kg (227 lb)   SpO2 97%   BMI 31.66 kg/m²   Estimated body mass index is 31.66 kg/m² as calculated from the following:    Height as of this encounter: 180.3 cm (71\").    Weight as of this encounter: 103 kg (227 lb).          Physical Exam  Vitals and nursing note reviewed.   Constitutional:       General: He is not in acute distress.     Appearance: He is well-developed.   Cardiovascular:      Rate and Rhythm: Normal rate and regular rhythm.      Heart sounds: Normal heart sounds.   Pulmonary:      Effort: Pulmonary effort is normal.      Breath sounds: Normal breath sounds.   Skin:     General: Skin is warm and dry.   Psychiatric:         Mood and Affect: Mood normal.        Result Review :  The following data was reviewed by: Carl Mckinley, " MD on 03/14/2025:  Common labs          8/21/2024    08:17 2/28/2025    08:48   Common Labs   Glucose 92  95    BUN 12  15    Creatinine 1.12  1.14    Sodium 137  136    Potassium 4.8  4.8    Chloride 100  101    Calcium 9.5  9.7    Albumin 4.3  4.4    Total Bilirubin 0.6  1.0    Alkaline Phosphatase 67  65    AST (SGOT) 34  28    ALT (SGPT) 29  27    WBC 8.42  7.08    Hemoglobin 16.0  15.7    Hematocrit 48.5  46.7    Platelets 226  255    Total Cholesterol 125  140    Triglycerides 48  61    HDL Cholesterol 41  39    LDL Cholesterol  73  88    PSA 0.752                   Assessment and Plan   Diagnoses and all orders for this visit:    1. Essential hypertension (Primary)  -     Comprehensive Metabolic Panel; Future  -     CBC & Differential; Future  -     Lipid Panel; Future    2. Pure hypercholesterolemia  -     Comprehensive Metabolic Panel; Future  -     CBC & Differential; Future  -     Lipid Panel; Future    3. Anxiety  -     Comprehensive Metabolic Panel; Future  -     CBC & Differential; Future  -     Lipid Panel; Future    4. Screening PSA (prostate specific antigen)  -     PSA Screen; Future    Other orders  -     escitalopram (LEXAPRO) 10 MG tablet; Take 1.5 tablets by mouth Daily.  Dispense: 135 tablet; Refill: 3  -     amLODIPine-benazepril (Lotrel) 10-20 MG per capsule; Take 1 capsule by mouth Daily.  Dispense: 90 capsule; Refill: 3      Hypertension.  Well-controlled.  Continue benazepril and amlodipine as is.  Normal creatinine.  No adverse effects.  See me in 6 months for annual complete physical examination with lab work prior.    Anxiety.  Long history.  Uptick recently.  No panic attacks.  Increase Lexapro from 10 mg a day up to 15 mg a day, 1/2 tablets daily.  Instructions given.  If things improve into later this year, he can consider going back down to 1 tablet.  With further questions he is certainly welcome to let us know.       Follow Up   No follow-ups on file.  Patient was given  instructions and counseling regarding his condition or for health maintenance advice. Please see specific information pulled into the AVS if appropriate.

## 2025-06-12 NOTE — ED STATDOCS - HEME/LYMPH NEGATIVE STATEMENT, MLM
[FreeTextEntry2] : Left Hip and Bl Thigh pain  A1C 8.5 on Monjara  no anemia, no easy bruising, no jaundice, no swollen lymph nodes.

## 2025-07-01 RX ORDER — ATORVASTATIN CALCIUM 10 MG/1
10 TABLET, FILM COATED ORAL DAILY
Qty: 90 TABLET | Refills: 1 | Status: SHIPPED | OUTPATIENT
Start: 2025-07-01

## 2025-07-01 NOTE — TELEPHONE ENCOUNTER
Rx Refill Note  Requested Prescriptions     Pending Prescriptions Disp Refills    atorvastatin (LIPITOR) 10 MG tablet [Pharmacy Med Name: Atorvastatin Calcium Oral Tablet 10 MG] 90 tablet 1     Sig: TAKE 1 TABLET BY MOUTH EVERY DAY      Last office visit with prescribing clinician: 3/14/2025   Last telemedicine visit with prescribing clinician: Visit date not found   Next office visit with prescribing clinician: 7/1/2025                         Would you like a call back once the refill request has been completed: [] Yes [] No    If the office needs to give you a call back, can they leave a voicemail: [] Yes [] No    Donald Meyer Rep  07/01/25, 11:59 EDT

## 2025-07-22 NOTE — ED CDU PROVIDER INITIAL DAY NOTE - ENMT, MLM
Airway patent. Nasal mucosa clear. Mouth with normal mucosa. Throat has no vesicles, no oropharyngeal exudates and uvula is midline.
23-Jul-2025 10:46

## 2025-09-04 VITALS
HEIGHT: 74 IN | TEMPERATURE: 98.2 F | WEIGHT: 218 LBS | BODY MASS INDEX: 27.98 KG/M2 | OXYGEN SATURATION: 95 % | SYSTOLIC BLOOD PRESSURE: 120 MMHG | DIASTOLIC BLOOD PRESSURE: 76 MMHG | HEART RATE: 92 BPM

## 2025-09-04 PROCEDURE — 99214 OFFICE O/P EST MOD 30 MIN: CPT

## 2025-09-04 RX ORDER — FLUTICASONE FUROATE, UMECLIDINIUM BROMIDE AND VILANTEROL TRIFENATATE 100; 62.5; 25 UG/1; UG/1; UG/1
100-62.5-25 POWDER RESPIRATORY (INHALATION)
Refills: 0 | Status: ACTIVE | COMMUNITY

## (undated) DEVICE — KT ORCA ORCAPOD DISP STRL

## (undated) DEVICE — TUBING, SUCTION, 1/4" X 10', STRAIGHT: Brand: MEDLINE

## (undated) DEVICE — ADAPT CLN BIOGUARD AIR/H2O DISP

## (undated) DEVICE — CANN O2 ETCO2 FITS ALL CONN CO2 SMPL A/ 7IN DISP LF

## (undated) DEVICE — THE TORRENT IRRIGATION SCOPE CONNECTOR IS USED WITH THE TORRENT IRRIGATION TUBING TO PROVIDE IRRIGATION FLUIDS SUCH AS STERILE WATER DURING GASTROINTESTINAL ENDOSCOPIC PROCEDURES WHEN USED IN CONJUNCTION WITH AN IRRIGATION PUMP (OR ELECTROSURGICAL UNIT).: Brand: TORRENT

## (undated) DEVICE — SENSR O2 OXIMAX FNGR A/ 18IN NONSTR

## (undated) DEVICE — LN SMPL CO2 SHTRM SD STREAM W/M LUER